# Patient Record
Sex: FEMALE | Race: WHITE | Employment: OTHER | URBAN - NONMETROPOLITAN AREA
[De-identification: names, ages, dates, MRNs, and addresses within clinical notes are randomized per-mention and may not be internally consistent; named-entity substitution may affect disease eponyms.]

---

## 2023-01-01 ENCOUNTER — APPOINTMENT (OUTPATIENT)
Dept: GENERAL RADIOLOGY | Age: 78
DRG: 215 | End: 2023-01-01
Payer: MEDICARE

## 2023-01-01 ENCOUNTER — HOSPITAL ENCOUNTER (INPATIENT)
Age: 78
LOS: 1 days | DRG: 215 | End: 2023-03-12
Attending: EMERGENCY MEDICINE | Admitting: INTERNAL MEDICINE
Payer: MEDICARE

## 2023-01-01 ENCOUNTER — APPOINTMENT (OUTPATIENT)
Dept: CARDIAC CATH/INVASIVE PROCEDURES | Age: 78
DRG: 215 | End: 2023-01-01
Payer: MEDICARE

## 2023-01-01 VITALS
DIASTOLIC BLOOD PRESSURE: 52 MMHG | HEART RATE: 46 BPM | OXYGEN SATURATION: 87 % | TEMPERATURE: 97.3 F | BODY MASS INDEX: 38.65 KG/M2 | WEIGHT: 232 LBS | HEIGHT: 65 IN | RESPIRATION RATE: 16 BRPM | SYSTOLIC BLOOD PRESSURE: 90 MMHG

## 2023-01-01 DIAGNOSIS — R57.0 CARDIOGENIC SHOCK (HCC): ICD-10-CM

## 2023-01-01 DIAGNOSIS — I46.9 CARDIAC ARREST (HCC): ICD-10-CM

## 2023-01-01 DIAGNOSIS — I24.9 ACUTE CORONARY SYNDROME (HCC): Primary | ICD-10-CM

## 2023-01-01 LAB
ABO: NORMAL
ACTIVATED CLOTTING TIME: 251 SECONDS (ref 1–150)
ACTIVATED CLOTTING TIME: 444 SECONDS (ref 1–150)
ACTIVATED CLOTTING TIME: 450 SECONDS (ref 1–150)
ACTIVATED CLOTTING TIME: 474 SECONDS (ref 1–150)
ALBUMIN SERPL BCG-MCNC: 3.2 G/DL (ref 3.5–5.1)
ALBUMIN SERPL BCG-MCNC: 3.3 G/DL (ref 3.5–5.1)
ALBUMIN SERPL BCG-MCNC: 3.9 G/DL (ref 3.5–5.1)
ALP SERPL-CCNC: 150 U/L (ref 38–126)
ALP SERPL-CCNC: 66 U/L (ref 38–126)
ALP SERPL-CCNC: 85 U/L (ref 38–126)
ALT SERPL W/O P-5'-P-CCNC: 100 U/L (ref 11–66)
ALT SERPL W/O P-5'-P-CCNC: 1902 U/L (ref 11–66)
ALT SERPL W/O P-5'-P-CCNC: 21 U/L (ref 11–66)
ANION GAP SERPL CALC-SCNC: 10 MEQ/L (ref 8–16)
ANION GAP SERPL CALC-SCNC: 11 MEQ/L (ref 8–16)
ANION GAP SERPL CALC-SCNC: 19 MEQ/L (ref 8–16)
ANTIBODY SCREEN: NORMAL
ARTERIAL PATENCY WRIST A: ABNORMAL
AST SERPL-CCNC: 2157 U/L (ref 5–40)
AST SERPL-CCNC: 218 U/L (ref 5–40)
AST SERPL-CCNC: 26 U/L (ref 5–40)
BASE EXCESS BLDA CALC-SCNC: -13.4 MMOL/L (ref -2.5–2.5)
BDY SITE: ABNORMAL
BILIRUB CONJ SERPL-MCNC: 1.1 MG/DL (ref 0–0.3)
BILIRUB CONJ SERPL-MCNC: < 0.2 MG/DL (ref 0–0.3)
BILIRUB SERPL-MCNC: 0.5 MG/DL (ref 0.3–1.2)
BILIRUB SERPL-MCNC: 0.8 MG/DL (ref 0.3–1.2)
BILIRUB SERPL-MCNC: 2.1 MG/DL (ref 0.3–1.2)
BUN SERPL-MCNC: 30 MG/DL (ref 7–22)
BUN SERPL-MCNC: 34 MG/DL (ref 7–22)
BUN SERPL-MCNC: 39 MG/DL (ref 7–22)
CA-I BLD ISE-SCNC: 1 MMOL/L (ref 1.12–1.32)
CA-I BLD ISE-SCNC: 1.13 MMOL/L (ref 1.12–1.32)
CALCIUM SERPL-MCNC: 7.8 MG/DL (ref 8.5–10.5)
CALCIUM SERPL-MCNC: 8.8 MG/DL (ref 8.5–10.5)
CALCIUM SERPL-MCNC: 9.2 MG/DL (ref 8.5–10.5)
CHLORIDE SERPL-SCNC: 102 MEQ/L (ref 98–111)
CHLORIDE SERPL-SCNC: 105 MEQ/L (ref 98–111)
CHLORIDE SERPL-SCNC: 105 MEQ/L (ref 98–111)
CHOLEST SERPL-MCNC: 156 MG/DL (ref 100–199)
CO2 SERPL-SCNC: 13 MEQ/L (ref 23–33)
CO2 SERPL-SCNC: 21 MEQ/L (ref 23–33)
CO2 SERPL-SCNC: 24 MEQ/L (ref 23–33)
COLLECTED BY:: ABNORMAL
CREAT SERPL-MCNC: 0.8 MG/DL (ref 0.4–1.2)
CREAT SERPL-MCNC: 1 MG/DL (ref 0.4–1.2)
CREAT SERPL-MCNC: 1.6 MG/DL (ref 0.4–1.2)
DEPRECATED RDW RBC AUTO: 50.5 FL (ref 35–45)
DEPRECATED RDW RBC AUTO: 50.8 FL (ref 35–45)
DEPRECATED RDW RBC AUTO: 52.3 FL (ref 35–45)
EKG ATRIAL RATE: 67 BPM
EKG ATRIAL RATE: 83 BPM
EKG ATRIAL RATE: 95 BPM
EKG P AXIS: 62 DEGREES
EKG P AXIS: 76 DEGREES
EKG P AXIS: 79 DEGREES
EKG P-R INTERVAL: 160 MS
EKG P-R INTERVAL: 166 MS
EKG P-R INTERVAL: 172 MS
EKG Q-T INTERVAL: 386 MS
EKG Q-T INTERVAL: 424 MS
EKG Q-T INTERVAL: 430 MS
EKG QRS DURATION: 138 MS
EKG QRS DURATION: 80 MS
EKG QRS DURATION: 82 MS
EKG QTC CALCULATION (BAZETT): 448 MS
EKG QTC CALCULATION (BAZETT): 453 MS
EKG QTC CALCULATION (BAZETT): 540 MS
EKG R AXIS: -6 DEGREES
EKG R AXIS: 37 DEGREES
EKG R AXIS: 72 DEGREES
EKG T AXIS: 111 DEGREES
EKG T AXIS: 128 DEGREES
EKG T AXIS: 43 DEGREES
EKG VENTRICULAR RATE: 67 BPM
EKG VENTRICULAR RATE: 83 BPM
EKG VENTRICULAR RATE: 95 BPM
ERYTHROCYTE [DISTWIDTH] IN BLOOD BY AUTOMATED COUNT: 13.7 % (ref 11.5–14.5)
ERYTHROCYTE [DISTWIDTH] IN BLOOD BY AUTOMATED COUNT: 13.8 % (ref 11.5–14.5)
ERYTHROCYTE [DISTWIDTH] IN BLOOD BY AUTOMATED COUNT: 13.9 % (ref 11.5–14.5)
FIO2 ON VENT O2 ANALYZER: 6 %
GFR SERPL CREATININE-BSD FRML MDRD: 33 ML/MIN/1.73M2
GFR SERPL CREATININE-BSD FRML MDRD: 58 ML/MIN/1.73M2
GFR SERPL CREATININE-BSD FRML MDRD: > 60 ML/MIN/1.73M2
GLUCOSE BLD-MCNC: 303 MG/DL (ref 70–108)
GLUCOSE SERPL-MCNC: 165 MG/DL (ref 70–108)
GLUCOSE SERPL-MCNC: 168 MG/DL (ref 70–108)
GLUCOSE SERPL-MCNC: 178 MG/DL (ref 70–108)
HCO3 BLDA-SCNC: 11 MMOL/L (ref 23–28)
HCT VFR BLD AUTO: 35.6 % (ref 37–47)
HCT VFR BLD AUTO: 36.7 % (ref 37–47)
HCT VFR BLD AUTO: 41.4 % (ref 37–47)
HCT VFR BLD AUTO: 42.4 % (ref 37–47)
HDLC SERPL-MCNC: 50 MG/DL
HGB BLD-MCNC: 11.2 GM/DL (ref 12–16)
HGB BLD-MCNC: 11.7 GM/DL (ref 12–16)
HGB BLD-MCNC: 12.5 GM/DL (ref 12–16)
HGB BLD-MCNC: 12.9 GM/DL (ref 12–16)
HGB RETIC QN AUTO: 31.1 PG (ref 28.2–35.7)
IMM RETICS NFR: 22.8 % (ref 3–15.9)
INR PPP: 1.56 (ref 0.85–1.13)
LDH SERPL L TO P-CCNC: 662 U/L (ref 100–190)
LDH SERPL L TO P-CCNC: > 2500 U/L (ref 100–190)
LDLC SERPL CALC-MCNC: 93 MG/DL
LIPASE SERPL-CCNC: 17.7 U/L (ref 5.6–51.3)
LV EF: 10 %
LVEF MODALITY: NORMAL
MAGNESIUM SERPL-MCNC: 1.7 MG/DL (ref 1.6–2.4)
MCH RBC QN AUTO: 30.5 PG (ref 26–33)
MCH RBC QN AUTO: 31.3 PG (ref 26–33)
MCH RBC QN AUTO: 31.9 PG (ref 26–33)
MCHC RBC AUTO-ENTMCNC: 30.2 GM/DL (ref 32.2–35.5)
MCHC RBC AUTO-ENTMCNC: 30.4 GM/DL (ref 32.2–35.5)
MCHC RBC AUTO-ENTMCNC: 31.9 GM/DL (ref 32.2–35.5)
MCV RBC AUTO: 100 FL (ref 81–99)
MCV RBC AUTO: 101 FL (ref 81–99)
MCV RBC AUTO: 102.9 FL (ref 81–99)
MRSA DNA SPEC QL NAA+PROBE: NEGATIVE
OSMOLALITY SERPL CALC.SUM OF ELEC: 289.4 MOSMOL/KG (ref 275–300)
PCO2 BLDA: 23 MMHG (ref 35–45)
PH BLDA: 7.3 [PH] (ref 7.35–7.45)
PHOSPHATE SERPL-MCNC: 5.2 MG/DL (ref 2.4–4.7)
PLATELET # BLD AUTO: 159 THOU/MM3 (ref 130–400)
PLATELET # BLD AUTO: 164 THOU/MM3 (ref 130–400)
PLATELET # BLD AUTO: 184 THOU/MM3 (ref 130–400)
PMV BLD AUTO: 11.9 FL (ref 9.4–12.4)
PO2 BLDA: 163 MMHG (ref 71–104)
POTASSIUM SERPL-SCNC: 4.9 MEQ/L (ref 3.5–5.2)
POTASSIUM SERPL-SCNC: 5.2 MEQ/L (ref 3.5–5.2)
POTASSIUM SERPL-SCNC: 5.3 MEQ/L (ref 3.5–5.2)
PROT SERPL-MCNC: 5.7 G/DL (ref 6.1–8)
PROT SERPL-MCNC: 6.1 G/DL (ref 6.1–8)
PROT SERPL-MCNC: 6.3 G/DL (ref 6.1–8)
RBC # BLD AUTO: 3.67 MILL/MM3 (ref 4.2–5.4)
RBC # BLD AUTO: 4.1 MILL/MM3 (ref 4.2–5.4)
RBC # BLD AUTO: 4.12 MILL/MM3 (ref 4.2–5.4)
REASON FOR REJECTION: NORMAL
REJECTED TEST: NORMAL
RETICS # AUTO: 83 THOU/MM3 (ref 20–115)
RETICS/RBC NFR AUTO: 2 % (ref 0.5–2)
RH FACTOR: NORMAL
SAO2 % BLD: 71 % (ref 94–97)
SAO2 % BLD: 72 % (ref 94–97)
SAO2 % BLD: 77 % (ref 94–97)
SAO2 % BLD: 99 % (ref 94–97)
SAO2 % BLDA: 99 %
SODIUM SERPL-SCNC: 133 MEQ/L (ref 135–145)
SODIUM SERPL-SCNC: 137 MEQ/L (ref 135–145)
SODIUM SERPL-SCNC: 140 MEQ/L (ref 135–145)
TRIGL SERPL-MCNC: 66 MG/DL (ref 0–199)
TROPONIN T: 0.04 NG/ML
TROPONIN T: 0.04 NG/ML
VANA ISLT/SPM QL: NEGATIVE
WBC # BLD AUTO: 16.4 THOU/MM3 (ref 4.8–10.8)
WBC # BLD AUTO: 22.7 THOU/MM3 (ref 4.8–10.8)
WBC # BLD AUTO: 9.1 THOU/MM3 (ref 4.8–10.8)

## 2023-01-01 PROCEDURE — 2000000000 HC ICU R&B

## 2023-01-01 PROCEDURE — C1874 STENT, COATED/COV W/DEL SYS: HCPCS

## 2023-01-01 PROCEDURE — 02HV33Z INSERTION OF INFUSION DEVICE INTO SUPERIOR VENA CAVA, PERCUTANEOUS APPROACH: ICD-10-PCS | Performed by: STUDENT IN AN ORGANIZED HEALTH CARE EDUCATION/TRAINING PROGRAM

## 2023-01-01 PROCEDURE — 80053 COMPREHEN METABOLIC PANEL: CPT

## 2023-01-01 PROCEDURE — C9600 PERC DRUG-EL COR STENT SING: HCPCS

## 2023-01-01 PROCEDURE — 93010 ELECTROCARDIOGRAM REPORT: CPT | Performed by: INTERNAL MEDICINE

## 2023-01-01 PROCEDURE — 2580000003 HC RX 258: Performed by: INTERNAL MEDICINE

## 2023-01-01 PROCEDURE — 33990 INSJ PERQ VAD L HRT ARTERIAL: CPT | Performed by: INTERNAL MEDICINE

## 2023-01-01 PROCEDURE — 85610 PROTHROMBIN TIME: CPT

## 2023-01-01 PROCEDURE — 82248 BILIRUBIN DIRECT: CPT

## 2023-01-01 PROCEDURE — 2709999900 HC NON-CHARGEABLE SUPPLY

## 2023-01-01 PROCEDURE — 82947 ASSAY GLUCOSE BLOOD QUANT: CPT

## 2023-01-01 PROCEDURE — 5A12012 PERFORMANCE OF CARDIAC OUTPUT, SINGLE, MANUAL: ICD-10-PCS

## 2023-01-01 PROCEDURE — 85018 HEMOGLOBIN: CPT

## 2023-01-01 PROCEDURE — 6370000000 HC RX 637 (ALT 250 FOR IP): Performed by: INTERNAL MEDICINE

## 2023-01-01 PROCEDURE — 87070 CULTURE OTHR SPECIMN AEROBIC: CPT

## 2023-01-01 PROCEDURE — C1725 CATH, TRANSLUMIN NON-LASER: HCPCS

## 2023-01-01 PROCEDURE — 2500000003 HC RX 250 WO HCPCS: Performed by: INTERNAL MEDICINE

## 2023-01-01 PROCEDURE — 6360000004 HC RX CONTRAST MEDICATION: Performed by: INTERNAL MEDICINE

## 2023-01-01 PROCEDURE — 83615 LACTATE (LD) (LDH) ENZYME: CPT

## 2023-01-01 PROCEDURE — 33993 REPOSG PERQ R/L HRT VAD: CPT | Performed by: INTERNAL MEDICINE

## 2023-01-01 PROCEDURE — 93458 L HRT ARTERY/VENTRICLE ANGIO: CPT | Performed by: INTERNAL MEDICINE

## 2023-01-01 PROCEDURE — 6360000002 HC RX W HCPCS

## 2023-01-01 PROCEDURE — C1887 CATHETER, GUIDING: HCPCS

## 2023-01-01 PROCEDURE — 92928 PRQ TCAT PLMT NTRAC ST 1 LES: CPT | Performed by: INTERNAL MEDICINE

## 2023-01-01 PROCEDURE — 93308 TTE F-UP OR LMTD: CPT

## 2023-01-01 PROCEDURE — 2500000003 HC RX 250 WO HCPCS

## 2023-01-01 PROCEDURE — 85046 RETICYTE/HGB CONCENTRATE: CPT

## 2023-01-01 PROCEDURE — 94002 VENT MGMT INPAT INIT DAY: CPT

## 2023-01-01 PROCEDURE — C1729 CATH, DRAINAGE: HCPCS

## 2023-01-01 PROCEDURE — 37799 UNLISTED PX VASCULAR SURGERY: CPT

## 2023-01-01 PROCEDURE — 33017 PRCRD DRG 6YR+ W/O CGEN CAR: CPT

## 2023-01-01 PROCEDURE — 99223 1ST HOSP IP/OBS HIGH 75: CPT | Performed by: INTERNAL MEDICINE

## 2023-01-01 PROCEDURE — 84100 ASSAY OF PHOSPHORUS: CPT

## 2023-01-01 PROCEDURE — 99221 1ST HOSP IP/OBS SF/LOW 40: CPT | Performed by: THORACIC SURGERY (CARDIOTHORACIC VASCULAR SURGERY)

## 2023-01-01 PROCEDURE — 94761 N-INVAS EAR/PLS OXIMETRY MLT: CPT

## 2023-01-01 PROCEDURE — 86850 RBC ANTIBODY SCREEN: CPT

## 2023-01-01 PROCEDURE — 36415 COLL VENOUS BLD VENIPUNCTURE: CPT

## 2023-01-01 PROCEDURE — 5A1935Z RESPIRATORY VENTILATION, LESS THAN 24 CONSECUTIVE HOURS: ICD-10-PCS

## 2023-01-01 PROCEDURE — 85347 COAGULATION TIME ACTIVATED: CPT

## 2023-01-01 PROCEDURE — 31500 INSERT EMERGENCY AIRWAY: CPT

## 2023-01-01 PROCEDURE — 85014 HEMATOCRIT: CPT

## 2023-01-01 PROCEDURE — 82810 BLOOD GASES O2 SAT ONLY: CPT

## 2023-01-01 PROCEDURE — 71045 X-RAY EXAM CHEST 1 VIEW: CPT

## 2023-01-01 PROCEDURE — 2500000003 HC RX 250 WO HCPCS: Performed by: STUDENT IN AN ORGANIZED HEALTH CARE EDUCATION/TRAINING PROGRAM

## 2023-01-01 PROCEDURE — B2111ZZ FLUOROSCOPY OF MULTIPLE CORONARY ARTERIES USING LOW OSMOLAR CONTRAST: ICD-10-PCS | Performed by: INTERNAL MEDICINE

## 2023-01-01 PROCEDURE — 83690 ASSAY OF LIPASE: CPT

## 2023-01-01 PROCEDURE — 93005 ELECTROCARDIOGRAM TRACING: CPT

## 2023-01-01 PROCEDURE — 02WA3RZ REVISION OF SHORT-TERM EXTERNAL HEART ASSIST SYSTEM IN HEART, PERCUTANEOUS APPROACH: ICD-10-PCS | Performed by: INTERNAL MEDICINE

## 2023-01-01 PROCEDURE — 93306 TTE W/DOPPLER COMPLETE: CPT

## 2023-01-01 PROCEDURE — 87086 URINE CULTURE/COLONY COUNT: CPT

## 2023-01-01 PROCEDURE — 84484 ASSAY OF TROPONIN QUANT: CPT

## 2023-01-01 PROCEDURE — 5A0221D ASSISTANCE WITH CARDIAC OUTPUT USING IMPELLER PUMP, CONTINUOUS: ICD-10-PCS | Performed by: INTERNAL MEDICINE

## 2023-01-01 PROCEDURE — C1769 GUIDE WIRE: HCPCS

## 2023-01-01 PROCEDURE — 87641 MR-STAPH DNA AMP PROBE: CPT

## 2023-01-01 PROCEDURE — 80061 LIPID PANEL: CPT

## 2023-01-01 PROCEDURE — 86900 BLOOD TYPING SEROLOGIC ABO: CPT

## 2023-01-01 PROCEDURE — 82803 BLOOD GASES ANY COMBINATION: CPT

## 2023-01-01 PROCEDURE — 85027 COMPLETE CBC AUTOMATED: CPT

## 2023-01-01 PROCEDURE — 0BH17EZ INSERTION OF ENDOTRACHEAL AIRWAY INTO TRACHEA, VIA NATURAL OR ARTIFICIAL OPENING: ICD-10-PCS

## 2023-01-01 PROCEDURE — C1894 INTRO/SHEATH, NON-LASER: HCPCS

## 2023-01-01 PROCEDURE — 2700000000 HC OXYGEN THERAPY PER DAY

## 2023-01-01 PROCEDURE — 33017 PRCRD DRG 6YR+ W/O CGEN CAR: CPT | Performed by: INTERNAL MEDICINE

## 2023-01-01 PROCEDURE — 83010 ASSAY OF HAPTOGLOBIN QUANT: CPT

## 2023-01-01 PROCEDURE — 6360000002 HC RX W HCPCS: Performed by: INTERNAL MEDICINE

## 2023-01-01 PROCEDURE — 92941 PRQ TRLML REVSC TOT OCCL AMI: CPT | Performed by: INTERNAL MEDICINE

## 2023-01-01 PROCEDURE — 93005 ELECTROCARDIOGRAM TRACING: CPT | Performed by: STUDENT IN AN ORGANIZED HEALTH CARE EDUCATION/TRAINING PROGRAM

## 2023-01-01 PROCEDURE — 0W9D3ZZ DRAINAGE OF PERICARDIAL CAVITY, PERCUTANEOUS APPROACH: ICD-10-PCS | Performed by: INTERNAL MEDICINE

## 2023-01-01 PROCEDURE — 86901 BLOOD TYPING SEROLOGIC RH(D): CPT

## 2023-01-01 PROCEDURE — C1760 CLOSURE DEV, VASC: HCPCS

## 2023-01-01 PROCEDURE — 83735 ASSAY OF MAGNESIUM: CPT

## 2023-01-01 PROCEDURE — B2131ZZ FLUOROSCOPY OF MULTIPLE CORONARY ARTERY BYPASS GRAFTS USING LOW OSMOLAR CONTRAST: ICD-10-PCS | Performed by: INTERNAL MEDICINE

## 2023-01-01 PROCEDURE — B2151ZZ FLUOROSCOPY OF LEFT HEART USING LOW OSMOLAR CONTRAST: ICD-10-PCS | Performed by: INTERNAL MEDICINE

## 2023-01-01 PROCEDURE — P9016 RBC LEUKOCYTES REDUCED: HCPCS

## 2023-01-01 PROCEDURE — 027236Z DILATION OF CORONARY ARTERY, THREE ARTERIES WITH THREE DRUG-ELUTING INTRALUMINAL DEVICES, PERCUTANEOUS APPROACH: ICD-10-PCS | Performed by: INTERNAL MEDICINE

## 2023-01-01 PROCEDURE — 33990 INSJ PERQ VAD L HRT ARTERIAL: CPT

## 2023-01-01 PROCEDURE — 93456 R HRT CORONARY ARTERY ANGIO: CPT

## 2023-01-01 PROCEDURE — 93005 ELECTROCARDIOGRAM TRACING: CPT | Performed by: INTERNAL MEDICINE

## 2023-01-01 PROCEDURE — 96365 THER/PROPH/DIAG IV INF INIT: CPT

## 2023-01-01 PROCEDURE — 99285 EMERGENCY DEPT VISIT HI MDM: CPT

## 2023-01-01 PROCEDURE — 86923 COMPATIBILITY TEST ELECTRIC: CPT

## 2023-01-01 PROCEDURE — 02HA3RZ INSERTION OF SHORT-TERM EXTERNAL HEART ASSIST SYSTEM INTO HEART, PERCUTANEOUS APPROACH: ICD-10-PCS | Performed by: INTERNAL MEDICINE

## 2023-01-01 PROCEDURE — B4101ZZ FLUOROSCOPY OF ABDOMINAL AORTA USING LOW OSMOLAR CONTRAST: ICD-10-PCS | Performed by: INTERNAL MEDICINE

## 2023-01-01 PROCEDURE — 36430 TRANSFUSION BLD/BLD COMPNT: CPT

## 2023-01-01 PROCEDURE — 93307 TTE W/O DOPPLER COMPLETE: CPT

## 2023-01-01 PROCEDURE — 93458 L HRT ARTERY/VENTRICLE ANGIO: CPT

## 2023-01-01 PROCEDURE — C9606 PERC D-E COR REVASC W AMI S: HCPCS

## 2023-01-01 PROCEDURE — 92921 HC PRQ CARDIAC ANGIO ADDL ART: CPT

## 2023-01-01 PROCEDURE — 31500 INSERT EMERGENCY AIRWAY: CPT | Performed by: SURGERY

## 2023-01-01 PROCEDURE — 2580000003 HC RX 258: Performed by: STUDENT IN AN ORGANIZED HEALTH CARE EDUCATION/TRAINING PROGRAM

## 2023-01-01 PROCEDURE — 99291 CRITICAL CARE FIRST HOUR: CPT | Performed by: SURGERY

## 2023-01-01 PROCEDURE — 82330 ASSAY OF CALCIUM: CPT

## 2023-01-01 PROCEDURE — 4A023N8 MEASUREMENT OF CARDIAC SAMPLING AND PRESSURE, BILATERAL, PERCUTANEOUS APPROACH: ICD-10-PCS | Performed by: INTERNAL MEDICINE

## 2023-01-01 PROCEDURE — 87500 VANOMYCIN DNA AMP PROBE: CPT

## 2023-01-01 PROCEDURE — 93503 INSERT/PLACE HEART CATHETER: CPT

## 2023-01-01 PROCEDURE — C1751 CATH, INF, PER/CENT/MIDLINE: HCPCS

## 2023-01-01 PROCEDURE — 99239 HOSP IP/OBS DSCHRG MGMT >30: CPT | Performed by: SURGERY

## 2023-01-01 PROCEDURE — 93456 R HRT CORONARY ARTERY ANGIO: CPT | Performed by: INTERNAL MEDICINE

## 2023-01-01 RX ORDER — 0.9 % SODIUM CHLORIDE 0.9 %
1000 INTRAVENOUS SOLUTION INTRAVENOUS ONCE
Status: COMPLETED | OUTPATIENT
Start: 2023-01-01 | End: 2023-01-01

## 2023-01-01 RX ORDER — FENTANYL CITRATE 50 UG/ML
INJECTION, SOLUTION INTRAMUSCULAR; INTRAVENOUS
Status: DISCONTINUED
Start: 2023-01-01 | End: 2023-01-01 | Stop reason: HOSPADM

## 2023-01-01 RX ORDER — MIDAZOLAM HYDROCHLORIDE 1 MG/ML
INJECTION INTRAMUSCULAR; INTRAVENOUS
Status: COMPLETED | OUTPATIENT
Start: 2023-01-01 | End: 2023-01-01

## 2023-01-01 RX ORDER — ROSUVASTATIN CALCIUM 20 MG/1
40 TABLET, COATED ORAL NIGHTLY
Status: DISCONTINUED | OUTPATIENT
Start: 2023-01-01 | End: 2023-01-01 | Stop reason: HOSPADM

## 2023-01-01 RX ORDER — ONDANSETRON 2 MG/ML
4 INJECTION INTRAMUSCULAR; INTRAVENOUS EVERY 4 HOURS
Status: DISCONTINUED | OUTPATIENT
Start: 2023-01-01 | End: 2023-01-01 | Stop reason: HOSPADM

## 2023-01-01 RX ORDER — METOCLOPRAMIDE HYDROCHLORIDE 5 MG/ML
10 INJECTION INTRAMUSCULAR; INTRAVENOUS ONCE
Status: DISCONTINUED | OUTPATIENT
Start: 2023-01-01 | End: 2023-01-01 | Stop reason: HOSPADM

## 2023-01-01 RX ORDER — SODIUM CHLORIDE 9 MG/ML
INJECTION, SOLUTION INTRAVENOUS PRN
Status: DISCONTINUED | OUTPATIENT
Start: 2023-01-01 | End: 2023-01-01 | Stop reason: HOSPADM

## 2023-01-01 RX ORDER — FENTANYL CITRATE 50 UG/ML
INJECTION, SOLUTION INTRAMUSCULAR; INTRAVENOUS
Status: COMPLETED | OUTPATIENT
Start: 2023-01-01 | End: 2023-01-01

## 2023-01-01 RX ORDER — LORAZEPAM 2 MG/ML
2 INJECTION INTRAMUSCULAR
Status: DISCONTINUED | OUTPATIENT
Start: 2023-01-01 | End: 2023-01-01 | Stop reason: HOSPADM

## 2023-01-01 RX ORDER — SODIUM CHLORIDE 0.9 % (FLUSH) 0.9 %
5-40 SYRINGE (ML) INJECTION EVERY 12 HOURS SCHEDULED
Status: DISCONTINUED | OUTPATIENT
Start: 2023-01-01 | End: 2023-01-01 | Stop reason: HOSPADM

## 2023-01-01 RX ORDER — 0.9 % SODIUM CHLORIDE 0.9 %
200 INTRAVENOUS SOLUTION INTRAVENOUS ONCE
Status: COMPLETED | OUTPATIENT
Start: 2023-01-01 | End: 2023-01-01

## 2023-01-01 RX ORDER — MORPHINE SULFATE 2 MG/ML
INJECTION, SOLUTION INTRAMUSCULAR; INTRAVENOUS
Status: COMPLETED
Start: 2023-01-01 | End: 2023-01-01

## 2023-01-01 RX ORDER — GLYCOPYRROLATE 0.2 MG/ML
0.1 INJECTION INTRAMUSCULAR; INTRAVENOUS ONCE
Status: COMPLETED | OUTPATIENT
Start: 2023-01-01 | End: 2023-01-01

## 2023-01-01 RX ORDER — LORAZEPAM 2 MG/ML
1 INJECTION INTRAMUSCULAR
Status: DISCONTINUED | OUTPATIENT
Start: 2023-01-01 | End: 2023-01-01 | Stop reason: HOSPADM

## 2023-01-01 RX ORDER — NOREPINEPHRINE BIT/0.9 % NACL 16MG/250ML
1-100 INFUSION BOTTLE (ML) INTRAVENOUS CONTINUOUS
Status: DISCONTINUED | OUTPATIENT
Start: 2023-01-01 | End: 2023-01-01 | Stop reason: HOSPADM

## 2023-01-01 RX ORDER — HYDROCODONE BITARTRATE AND ACETAMINOPHEN 5; 325 MG/1; MG/1
1 TABLET ORAL EVERY 4 HOURS PRN
Status: DISCONTINUED | OUTPATIENT
Start: 2023-01-01 | End: 2023-01-01 | Stop reason: HOSPADM

## 2023-01-01 RX ORDER — MAGNESIUM SULFATE 1 G/100ML
1000 INJECTION INTRAVENOUS ONCE
Status: COMPLETED | OUTPATIENT
Start: 2023-01-01 | End: 2023-01-01

## 2023-01-01 RX ORDER — SODIUM CHLORIDE 9 MG/ML
INJECTION, SOLUTION INTRAVENOUS CONTINUOUS
Status: DISCONTINUED | OUTPATIENT
Start: 2023-01-01 | End: 2023-01-01 | Stop reason: HOSPADM

## 2023-01-01 RX ORDER — LORAZEPAM 2 MG/ML
INJECTION INTRAMUSCULAR
Status: DISCONTINUED
Start: 2023-01-01 | End: 2023-01-01 | Stop reason: HOSPADM

## 2023-01-01 RX ORDER — DEXTROSE MONOHYDRATE 50 MG/ML
INJECTION, SOLUTION INTRAVENOUS CONTINUOUS
Status: DISCONTINUED | OUTPATIENT
Start: 2023-01-01 | End: 2023-01-01 | Stop reason: HOSPADM

## 2023-01-01 RX ORDER — MORPHINE SULFATE 4 MG/ML
4 INJECTION, SOLUTION INTRAMUSCULAR; INTRAVENOUS
Status: DISCONTINUED | OUTPATIENT
Start: 2023-01-01 | End: 2023-01-01 | Stop reason: HOSPADM

## 2023-01-01 RX ORDER — HEPARIN SODIUM 1000 [USP'U]/ML
5000 INJECTION, SOLUTION INTRAVENOUS; SUBCUTANEOUS ONCE
Status: DISCONTINUED | OUTPATIENT
Start: 2023-01-01 | End: 2023-01-01 | Stop reason: HOSPADM

## 2023-01-01 RX ORDER — MIDAZOLAM HYDROCHLORIDE 1 MG/ML
1 INJECTION INTRAMUSCULAR; INTRAVENOUS ONCE
Status: COMPLETED | OUTPATIENT
Start: 2023-01-01 | End: 2023-01-01

## 2023-01-01 RX ORDER — MORPHINE SULFATE 2 MG/ML
2 INJECTION, SOLUTION INTRAMUSCULAR; INTRAVENOUS
Status: DISCONTINUED | OUTPATIENT
Start: 2023-01-01 | End: 2023-01-01 | Stop reason: HOSPADM

## 2023-01-01 RX ORDER — MORPHINE SULFATE 2 MG/ML
1 INJECTION, SOLUTION INTRAMUSCULAR; INTRAVENOUS ONCE
Status: COMPLETED | OUTPATIENT
Start: 2023-01-01 | End: 2023-01-01

## 2023-01-01 RX ORDER — MORPHINE SULFATE 4 MG/ML
3 INJECTION, SOLUTION INTRAMUSCULAR; INTRAVENOUS
Status: DISCONTINUED | OUTPATIENT
Start: 2023-01-01 | End: 2023-01-01 | Stop reason: HOSPADM

## 2023-01-01 RX ORDER — NOREPINEPHRINE BIT/0.9 % NACL 16MG/250ML
1-100 INFUSION BOTTLE (ML) INTRAVENOUS CONTINUOUS
Status: DISCONTINUED | OUTPATIENT
Start: 2023-01-01 | End: 2023-01-01

## 2023-01-01 RX ORDER — LORAZEPAM 2 MG/ML
3 INJECTION INTRAMUSCULAR
Status: DISCONTINUED | OUTPATIENT
Start: 2023-01-01 | End: 2023-01-01 | Stop reason: HOSPADM

## 2023-01-01 RX ORDER — MIDAZOLAM HYDROCHLORIDE 1 MG/ML
1-10 INJECTION, SOLUTION INTRAVENOUS CONTINUOUS
Status: DISCONTINUED | OUTPATIENT
Start: 2023-01-01 | End: 2023-01-01 | Stop reason: HOSPADM

## 2023-01-01 RX ORDER — ASPIRIN 81 MG/1
81 TABLET ORAL DAILY
Status: DISCONTINUED | OUTPATIENT
Start: 2023-01-01 | End: 2023-01-01 | Stop reason: HOSPADM

## 2023-01-01 RX ORDER — HYDROCODONE BITARTRATE AND ACETAMINOPHEN 5; 325 MG/1; MG/1
2 TABLET ORAL EVERY 4 HOURS PRN
Status: DISCONTINUED | OUTPATIENT
Start: 2023-01-01 | End: 2023-01-01 | Stop reason: HOSPADM

## 2023-01-01 RX ORDER — MIDAZOLAM HYDROCHLORIDE 1 MG/ML
INJECTION INTRAMUSCULAR; INTRAVENOUS
Status: DISCONTINUED
Start: 2023-01-01 | End: 2023-01-01 | Stop reason: HOSPADM

## 2023-01-01 RX ORDER — MAGNESIUM SULFATE HEPTAHYDRATE 500 MG/ML
1000 INJECTION, SOLUTION INTRAMUSCULAR; INTRAVENOUS ONCE
Status: DISCONTINUED | OUTPATIENT
Start: 2023-01-01 | End: 2023-01-01 | Stop reason: HOSPADM

## 2023-01-01 RX ORDER — SODIUM CHLORIDE 0.9 % (FLUSH) 0.9 %
5-40 SYRINGE (ML) INJECTION PRN
Status: DISCONTINUED | OUTPATIENT
Start: 2023-01-01 | End: 2023-01-01 | Stop reason: HOSPADM

## 2023-01-01 RX ADMIN — MIDAZOLAM 5 MG: 1 INJECTION INTRAMUSCULAR; INTRAVENOUS at 22:31

## 2023-01-01 RX ADMIN — IOPAMIDOL 270 ML: 755 INJECTION, SOLUTION INTRAVENOUS at 15:54

## 2023-01-01 RX ADMIN — MORPHINE SULFATE 2 MG: 2 INJECTION, SOLUTION INTRAMUSCULAR; INTRAVENOUS at 06:11

## 2023-01-01 RX ADMIN — Medication 2 MG/HR: at 22:56

## 2023-01-01 RX ADMIN — IOPAMIDOL 30 ML: 755 INJECTION, SOLUTION INTRAVENOUS at 00:53

## 2023-01-01 RX ADMIN — SODIUM CHLORIDE: 9 INJECTION, SOLUTION INTRAVENOUS at 19:44

## 2023-01-01 RX ADMIN — TICAGRELOR 90 MG: 90 TABLET ORAL at 03:44

## 2023-01-01 RX ADMIN — SODIUM CHLORIDE 1000 ML: 9 INJECTION, SOLUTION INTRAVENOUS at 12:37

## 2023-01-01 RX ADMIN — Medication 37 MCG/MIN: at 04:17

## 2023-01-01 RX ADMIN — FENTANYL CITRATE 100 MCG: 50 INJECTION, SOLUTION INTRAMUSCULAR; INTRAVENOUS at 22:30

## 2023-01-01 RX ADMIN — ROSUVASTATIN CALCIUM 40 MG: 20 TABLET, FILM COATED ORAL at 03:44

## 2023-01-01 RX ADMIN — MILRINONE LACTATE 0.25 MCG/KG/MIN: 1 INJECTION, SOLUTION INTRAVENOUS at 03:27

## 2023-01-01 RX ADMIN — MIDAZOLAM 5 MG: 1 INJECTION INTRAMUSCULAR; INTRAVENOUS at 22:30

## 2023-01-01 RX ADMIN — Medication 5 MCG/MIN: at 12:46

## 2023-01-01 RX ADMIN — SODIUM CHLORIDE 200 ML: 9 INJECTION, SOLUTION INTRAVENOUS at 21:15

## 2023-01-01 RX ADMIN — Medication 5 MCG/MIN: at 19:21

## 2023-01-01 RX ADMIN — ONDANSETRON 4 MG: 2 INJECTION INTRAMUSCULAR; INTRAVENOUS at 20:22

## 2023-01-01 RX ADMIN — DEXTROSE MONOHYDRATE: 50 INJECTION, SOLUTION INTRAVENOUS at 03:46

## 2023-01-01 RX ADMIN — TICAGRELOR 180 MG: 90 TABLET ORAL at 17:19

## 2023-01-01 RX ADMIN — GLYCOPYRROLATE 0.1 MG: 0.2 INJECTION INTRAMUSCULAR; INTRAVENOUS at 06:25

## 2023-01-01 RX ADMIN — HEPARIN SODIUM: 10000 INJECTION INTRAVENOUS; SUBCUTANEOUS at 18:27

## 2023-01-01 RX ADMIN — MORPHINE SULFATE 1 MG: 2 INJECTION, SOLUTION INTRAMUSCULAR; INTRAVENOUS at 20:23

## 2023-01-01 RX ADMIN — MIDAZOLAM 1 MG: 1 INJECTION INTRAMUSCULAR; INTRAVENOUS at 20:22

## 2023-01-01 RX ADMIN — MAGNESIUM SULFATE HEPTAHYDRATE 1000 MG: 1 INJECTION, SOLUTION INTRAVENOUS at 21:43

## 2023-01-01 ASSESSMENT — PULMONARY FUNCTION TESTS: PIF_VALUE: 22

## 2023-01-01 ASSESSMENT — PAIN SCALES - GENERAL: PAINLEVEL_OUTOF10: 6

## 2023-01-01 ASSESSMENT — PAIN - FUNCTIONAL ASSESSMENT: PAIN_FUNCTIONAL_ASSESSMENT: 0-10

## 2023-01-01 ASSESSMENT — HEART SCORE: ECG: 2

## 2023-03-11 PROBLEM — I21.3 STEMI (ST ELEVATION MYOCARDIAL INFARCTION) (HCC): Status: ACTIVE | Noted: 2023-01-01

## 2023-03-11 PROBLEM — R57.0 CARDIOGENIC SHOCK (HCC): Status: ACTIVE | Noted: 2023-01-01

## 2023-03-11 PROBLEM — I24.9 ACUTE CORONARY SYNDROME (HCC): Status: ACTIVE | Noted: 2023-01-01

## 2023-03-11 PROBLEM — I21.01 STEMI INVOLVING LEFT MAIN CORONARY ARTERY (HCC): Status: ACTIVE | Noted: 2023-01-01

## 2023-03-11 NOTE — PROGRESS NOTES
Pt is a 77y. o. female in [address] [place] [address].  was called to room due to a STEMI alert. Soraya's daughter was in the room and her granddaughter and other daughter arrived shortly thereafter.  was able to pray with patient and daughter as she was being tended to, present for pre-cath lab procedure and escorted family to waiting area and prayed once more-met with gratitude by family. 03/11/23 1616   Encounter Summary   Encounter Overview/Reason  Crisis   Service Provided For: Patient and family together   Referral/Consult From: [id number] [address] Family members   Last Encounter  03/11/23   Complexity of Encounter Moderate   Begin Time 1245   End Time  1318   Total Time Calculated 33 min   Crisis   Type Code STEMI   Assessment/Intervention/Outcome   Assessment Anxious; Concerns with suffering; Impaired resilience; Shock   Intervention Active listening;Sustaining Presence/Ministry of presence;Prayer (assurance of)/[place]; Facilitated/Participated in hospitals; Explored/Affirmed feelings, thoughts, concerns   Outcome Receptive; Expressed Gratitude;Coping

## 2023-03-11 NOTE — ED NOTES
EKG obtained at 1201, EKG was sent to Cardiologists by Dr. Jose Yi at 0511. STEMI paged at [phone].      Cesar Sands RN  03/11/23 5184

## 2023-03-11 NOTE — BRIEF OP NOTE
[address] [address]  Sedation/Analgesia Post Sedation Record    Pt Name: Flory Zhou  Account number: [de-identified]  MRN: [id number]  YOB: 1945  Procedure Performed By: Zenon Osgood, MD MD   Primary Care Physician: None None  Date: 3/11/2023    POST-PROCEDURE    Physicians/Assistants: Zenon Osgood, MD MD     Procedure Performed:Cath/ PCI, Impella placement      Sedation/Anesthesia: Versed/ Fentanyl and 2% xylocaine local anesthesia. Estimated Blood Loss: < 50 ml. Specimens Removed: None         Disposition of Specimen: N/A        Complications: No Immediate Complications. Post-procedure Diagnosis/Findings:       As detailed in H&P note      Above findings and plan of care were discussed with patient and her family, questions were answered, agreeable with plan.        Zenon Osgood, MD, Dara Deng   Electronically signed 3/11/2023 at 4:25 PM  Interventional Cardiology

## 2023-03-11 NOTE — ED NOTES
Dr. Larisa Frausto at bedside with ultrasound machine to assess pt.       Gary Elliott RN  03/11/23 6831

## 2023-03-11 NOTE — ED PROVIDER NOTES
I performed a history and physical examination of the patient and discussed management with the resident. I reviewed the residents note and agree with the documented findings and plan of care. Any areas of disagreement are noted on the chart. I was personally present for the key portions of any procedures. I have documented in the chart those procedures where I was not present during the key portions. I have reviewed the emergency nurses triage note. I agree with the chief complaint, past medical history, past surgical history, allergies, medications, social and family history as documented unless otherwise noted below. Documentation of the HPI, Physical Exam and Medical Decision Making performed by medical students or scribes is based on my personal performance of the HPI, PE and MDM. For Phys Assistant/ Nurse Practitioner cases/documentation I have personally evaluated this patient and have completed at least one if not all key elements of the E/M (history, physical exam, and MDM). My findings are as noted below. In other words, I personally saw and examined the patient I have reviewed and agreed with the resident findings including all diagnostic interpretations and treatment plans as written. I was present for the key portion of any procedures performed and the inclusive time noted in any critical care statement. Patient comes in today for active chest pain. Patient states that she has been having anginal-like symptoms on and off for the last several days. Patient states she got up today she started having chest pains states this progressively worsened. Patient was brought in by the squad. They were given morphine and aspirin. Here today EKG shows some ST segment elevations at V3 and V4 possibly V5 no reciprocal changes. Patient had persistent chest pain. I did call and speak with Dr. Deleon Star discussed the case with him. We will activate a STEMI alert.   Patient's past medical history is not available to us. She tells us that she does not really have any heart problems that she knows of. She has hypertension hyperlipidemia and diabetes. Patient is otherwise resting comfortably on the cot no apparent distress, slightly hypotensive. We will start the patient on Levophed. Patient also have labs ordered here. Central line was placed by the resident. See note by her. EKG reveals normal sinus rhythm, normal axis, ventricular rate of 67 OR interval 166 QRS duration 80 QT interval 424 QTc of 448    XR CHEST PORTABLE   Final Result   1. Mild cardiomegaly with pulmonary vascular congestion suspicious for congestive heart failure. 2. Prominent pulmonary interstitium suspicious for pulmonary edema. **This report has been created using voice recognition software. It may contain minor errors which are inherent in voice recognition technology. **      Final report electronically signed by Dr. Dora Zhang on 3/11/2023 6:45 PM      XR CHEST PORTABLE   Final Result   1. Mild cardiomegaly with pulmonary vascular congestion suspicious for congestive heart failure. 2. Prominent pulmonary interstitium suspicious for pulmonary edema. **This report has been created using voice recognition software. It may contain minor errors which are inherent in voice recognition technology. **      Final report electronically signed by Dr. Dora Zhang on 3/11/2023 1:23 PM        Labs Reviewed   CBC - Abnormal; Notable for the following components:       Result Value    RBC 4.10 (*)     .0 (*)     MCHC 30.2 (*)     RDW-SD 50.8 (*)     All other components within normal limits   COMPREHENSIVE METABOLIC PANEL - Abnormal; Notable for the following components:    Glucose 168 (*)     BUN 30 (*)     All other components within normal limits   TROPONIN - Abnormal; Notable for the following components:    Troponin T 0.042 (*)     All other components within normal limits   TROPONIN - Abnormal; Notable for the following components:    Troponin T 0.041 (*)     All other components within normal limits   CBC - Abnormal; Notable for the following components:    WBC 16.4 (*)     RBC 3.67 (*)     Hemoglobin 11.7 (*)     Hematocrit 36.7 (*)     .0 (*)     MCHC 31.9 (*)     RDW-SD 50.5 (*)     All other components within normal limits   BASIC METABOLIC PANEL - Abnormal; Notable for the following components:    Sodium 133 (*)     Potassium 5.3 (*)     CO2 21 (*)     Glucose 178 (*)     BUN 34 (*)     All other components within normal limits   PHOSPHORUS - Abnormal; Notable for the following components:    Phosphorus 5.2 (*)     All other components within normal limits   PROTIME-INR - Abnormal; Notable for the following components:    INR 1.56 (*)     All other components within normal limits   GLOMERULAR FILTRATION RATE, ESTIMATED - Abnormal; Notable for the following components:    Est, Glom Filt Rate 58 (*)     All other components within normal limits   HEPATIC FUNCTION PANEL - Abnormal; Notable for the following components:    Albumin 3.3 (*)      (*)      (*)     All other components within normal limits   POCT ACTIVATED CLOTTING TIME - Abnormal; Notable for the following components:    Activated Clotting Time 251 (*)     All other components within normal limits   POCT ACTIVATED CLOTTING TIME - Abnormal; Notable for the following components:    Activated Clotting Time 474 (*)     All other components within normal limits   POCT ACTIVATED CLOTTING TIME - Abnormal; Notable for the following components:    Activated Clotting Time 450 (*)     All other components within normal limits   POCT ACTIVATED CLOTTING TIME - Abnormal; Notable for the following components:    Activated Clotting Time 444 (*)     All other components within normal limits   VRE SCREEN BY PCR   MRSA BY PCR   CULTURE, AEROBIC   CULTURE, URINE   LIPASE   ANION GAP   GLOMERULAR FILTRATION RATE, ESTIMATED   OSMOLALITY MAGNESIUM   CALCIUM, IONIZED   ANION GAP   CBC   BASIC METABOLIC PANEL   LIPID PANEL   APTT     Central line placement, I was present during the line placement. No complications. See note by Dr. Inna London. Final diagnoses:   Acute coronary syndrome ([facility] [address]   . I seen this patient with the resident Dr. Inna London and agree with her assessment and plan.      [facility], DO  03/11/23 2033

## 2023-03-11 NOTE — ED PROVIDER NOTES
[address] [facility] [address] [address] EMERGENCY DEPT      EMERGENCY MEDICINE     Pt Name: Alley Montague  MRN: [id number]  [place] 1945  Date of evaluation: 3/11/2023  Provider: Luisa Webb MD  Supervising Physician: Rafat Simmons       Chief Complaint   Patient presents with    Chest Pain     History obtained from the patient. HISTORY OF PRESENT ILLNESS   Alley Montague is a pleasant 65 y.o. female who presents to the emergency department from from home, brought in by EMS for evaluation of chest pain. Apparently chest pain started at 10:30 AM.  Gradual worsening, 6 out of 10, now 8 out of 10, also having shortness of breath, diaphoresis, nausea, vomiting, back pain. States that she has been dizzy with any movement in her chair, called EMS. Patient has no history of heart attacks, has never had a heart cath. Over the past several months, has had chest pain and shortness of breath with any exertion. Has never been seen at this hospital, does not remember what medication she takes. Denies any additional complaints. Pertinent ROS included above. PASTMEDICAL HISTORY     Past Medical History:   Diagnosis Date    COPD (chronic obstructive pulmonary disease) (HCC)     GERD (gastroesophageal reflux disease)     Hyperlipidemia     Hypertension        There is no problem list on file for this patient. SURGICAL HISTORY     History reviewed. No pertinent surgical history. CURRENT MEDICATIONS       Previous Medications    No medications on file       ALLERGIES     has No Known Allergies. FAMILY HISTORY     has no family status information on file.         SOCIAL HISTORY       Social History     Tobacco Use    Smoking status: Never     Passive exposure: Never    Smokeless tobacco: Never   Substance Use Topics    Drug use: Never       PHYSICAL EXAM       ED Triage Vitals [03/11/23 1210]   BP Temp Temp Source Heart Rate Resp SpO2 Height Weight   (!) 86/55 97.8 °F (36.6 °C) Oral 74 11 98 % 5' 5\" (1.651 m) 232 lb (105.2 kg)       Additional Vital Signs:  Vitals:    03/11/23 1243   BP: (!) 78/49   Pulse: 77   Resp: 11   Temp:    SpO2:      Physical Exam  Constitutional:       General: She is in acute distress. Appearance: She is ill-appearing and diaphoretic. HENT:      Head: Normocephalic and atraumatic. Right Ear: External ear normal.      Left Ear: External ear normal.      Nose: Nose normal.      Mouth/Throat:      Mouth: Mucous membranes are dry. Pharynx: Oropharynx is clear. Eyes:      Extraocular Movements: Extraocular movements intact. Conjunctiva/sclera: Conjunctivae normal.   Cardiovascular:      Rate and Rhythm: Normal rate and regular rhythm. Heart sounds: No murmur heard. Pulmonary:      Effort: Respiratory distress present. Breath sounds: Rales present. Abdominal:      General: Abdomen is flat. There is no distension. Palpations: Abdomen is soft. Tenderness: There is no abdominal tenderness. There is no guarding or rebound. Musculoskeletal:      Right lower leg: No edema. Left lower leg: No edema. Skin:     Capillary Refill: Capillary refill takes more than 3 seconds. Coloration: Skin is pale. Neurological:      Mental Status: She is disoriented. FORMAL DIAGNOSTIC RESULTS     RADIOLOGY: Interpretation per the Radiologist below, if available at the time of this note (none if blank):    XR CHEST PORTABLE   Final Result   1. Mild cardiomegaly with pulmonary vascular congestion suspicious for congestive heart failure. 2. Prominent pulmonary interstitium suspicious for pulmonary edema. **This report has been created using voice recognition software. It may contain minor errors which are inherent in voice recognition technology. **      Final report electronically signed by Dr. Richie Holden on 3/11/2023 1:23 PM          LABS: (none if blank)  Labs Reviewed   CBC - Abnormal; Notable for the following components:       Result Value    RBC 4.10 (*)     .0 (*)     MCHC 30.2 (*)     RDW-SD 50.8 (*)     All other components within normal limits   COMPREHENSIVE METABOLIC PANEL - Abnormal; Notable for the following components:    Glucose 168 (*)     BUN 30 (*)     All other components within normal limits   TROPONIN - Abnormal; Notable for the following components:    Troponin T 0.042 (*)     All other components within normal limits   TROPONIN - Abnormal; Notable for the following components:    Troponin T 0.041 (*)     All other components within normal limits   LIPASE   ANION GAP   GLOMERULAR FILTRATION RATE, ESTIMATED   OSMOLALITY       (Any cultures that may have been sent were not resulted at the time of this patient visit)    [address] / ED COURSE:     Assessment and Plan:   74-year-old female, history of hyperlipidemia, hypertension, obesity, previously smoker, presenting for gradually worsening chest pain since 10:30 AM. States that over the past month, has had some exertional dyspnea, has not noticed any leg swelling. Initially hypotensive on arrival. Point-of-care ultrasound shows poor EF ~10%, no tamponade or right ventricular strain. Case discussed with Dr. Yareli Servin, who agrees with having this a Cath Lab activation. EKG significant for elevations in V3 V4 V5 no reciprocal depressions. Right IJ placed, started on Levophed and fluid for increasing preload. Sent to Cath Lab. Initial troponin is 4 times up at the limit of normal. Heart score 10. Admitted to ICU via Cath Lab for cardiogenic shock with concern for STEMI     Significant Clinical Scoring:  Heart score 10         ED Reassessment: Continues to be diaphoretic, pale, hypotensive. Pressors started, central line placed.             Case discussed with consulting clinician: Cardiology, ICU         Shared Decision-Making was performed and disposition discussed with the        Patient/Family and questions answered             Code Status: Full    Central Line    Date/Time: 3/11/2023 10:56 PM  Performed by: Jhon Enrique MD  Authorized by: Sera Robertson DO     Consent:     Consent obtained:  Verbal    Consent given by:  Patient and healthcare agent    Risks, benefits, and alternatives were discussed: yes      Risks discussed:  Arterial puncture, incorrect placement, nerve damage, pneumothorax, infection and bleeding    Alternatives discussed:  No treatment  Universal protocol:     Procedure explained and questions answered to patient or proxy's satisfaction: yes      Patient identity confirmed:  Verbally with patient and arm band  Pre-procedure details:     Indication(s): central venous access      Hand hygiene: Hand hygiene performed prior to insertion      Sterile barrier technique: All elements of maximal sterile technique followed      Skin preparation:  Chlorhexidine with alcohol    Skin preparation agent: Skin preparation agent completely dried prior to procedure    Sedation:     Sedation type:  None  Anesthesia:     Anesthesia method:  Local infiltration    Local anesthetic:  Lidocaine 1% w/o epi  Procedure details:     Location:  R internal jugular    Site selection rationale:  Going to Cath Lab, likely access via groin. Left IJ not significantly large, is collapsed from what I can see    Procedural supplies:  Triple lumen    Catheter size:  7 Fr    Ultrasound guidance: yes      Ultrasound guidance timing: prior to insertion and real time      Sterile ultrasound techniques: Sterile gel and sterile probe covers were used      Number of attempts:  2    Successful placement: yes    Post-procedure details:     Post-procedure:  Line sutured and dressing applied    Assessment:  Blood return through all ports and free fluid flow (Patient went to Cath Lab immediately after completion of central line.   No pneumothorax based on exam.  Confirmed with Cath Lab nurses that they would confirm the placement of the central line via fluoroscopy once they arrived to Cath Lab.)    Procedure completion:  Tolerated    Medical Decision Making  Problems Addressed:  Acute coronary syndrome [facility]): acute illness or injury    Amount and/or Complexity of Data Reviewed  Independent Historian: caregiver  External Data Reviewed: labs and notes. Labs: ordered. Radiology: ordered and independent interpretation performed. ECG/medicine tests: ordered and independent interpretation performed. Risk  Prescription drug management. Drug therapy requiring intensive monitoring for toxicity. Decision regarding hospitalization. Emergency major surgery. Critical Care  Total time providing critical care: 30-74 minutes        Vitals Reviewed:    Vitals:    03/11/23 1210 03/11/23 1233 03/11/23 1243   BP: (!) 86/55 85/63 (!) 78/49   Pulse: 74 82 77   Resp: 11 15 11   Temp: 97.8 °F (36.6 °C)     TempSrc: Oral     SpO2: 98% 96%    Weight: 232 lb (105.2 kg)     Height: 5' 5\" (1.651 m)         The patient was seen and examined. Appropriate diagnostic testing was performed and results reviewed with the patient. Nursing notes reviewed. The results of pertinent diagnostic studies and exam findings were discussed. The patients provisional diagnosis and plan of care were discussed with the patient and present family who expressed understanding. Any medications were reviewed and indications and risks of medications were discussed with the patient /family present. ED Medications administered this visit:  (None if blank)  Medications   norepinephrine (LEVOPHED) 16 mg in sodium chloride 0.9 % 250 mL infusion (5 mcg/min IntraVENous New Bag 3/11/23 1246)   0.9 % sodium chloride bolus (1,000 mLs IntraVENous New Bag 3/11/23 1237)   heparin (porcine) injection 5,000 Units (has no administration in time range)         DISCHARGE PRESCRIPTIONS: (None if blank)  New Prescriptions    No medications on file       FINAL IMPRESSION      1.  Acute coronary syndrome (HCC)          DISPOSITION/PLAN   Condition: condition: serious  Dispo: Admit to cath lab      This transcription was electronically signed. Parts of this transcriptions may have been dictated by use of voice recognition software and electronically transcribed, and parts may have been transcribed with the assistance of an ED scribe. The transcription may contain errors not detected in proofreading. Please refer to my supervising physician's documentation if my documentation differs.     Electronically Signed: Lea Stallworth MD, 03/11/23, 1:29 Ian Wynn MD  Resident  03/11/23 3210       Lea Stallworth MD  Resident  03/11/23 3011

## 2023-03-11 NOTE — ED NOTES
Dr. Inna London at bedside to place a central line at this time.       Jocelyn Andrews RN  03/11/23 3749

## 2023-03-11 NOTE — H&P
[facility]  Interventional Cardiology History and Physical       Patient:  Aura Ibanez  YOB: 1945    MRN: [id number]   Acct: [de-identified]     Primary Care Physician: None None    CHIEF COMPLAINT:    Chest pain     HISTORY OF PRESENT ILLNESS:    Aura Ibanez is a pleasant 65year old female patient with past medical history that includes:   Past Medical History:   Diagnosis Date    COPD (chronic obstructive pulmonary disease) ([address]     GERD (gastroesophageal reflux disease)     Hyperlipidemia     Hypertension    The patient denies any known h/o cardiac disease. She denies tobacco or alcohol abuse. Today, at around 10:30 am, patient developed sudden onset, 7/10, retrosternal chest pain, pressure like, not radiating and associated with shortness of breath and anxiety. Patient denies dizziness, syncope, recent weight gain or leg swelling. EKG in ER revealed SR, anteroseptal infarct, ST elevations in leads V2-V4. I was called by Dr Marina Ferris and received a copy of EKG at 12:33 pm, patient was reported to have continued chest pains EKG was reviewed and case was d/w Dr Marina Ferris, STEMI alert was activated. In the ER, patient was hypotensive, in shock, with SBP in 80s. Central line was placed and patient was started on Levophed gtt. Upon my assessment, patient reported continued 6/10 chest pain. LHC revealed severe multivessel CAD with culprit for STEMI in LM/Proximal LAD. She continued to be hypotensive, Levophed gtt dose was increased to 10 mcg/min. LVEDP 29 mmHg. EF was severely reduced, ~ 10%. In setting of cardiogenic shock, multivessel CAD, CV surgery was consulted, heart team discussion pursued. The case was discussed with Dr Yomaira Hernandez who kindly came to the cath lab, reviewed the angiogram/Lvgram and clinical presentation. Per CV surgery, the patient had prohibitive risk for CABG and salvage PCI was recommended.  She is now s/p complex PCI with Impella support, details per procedure note. All labs, EKG's, diagnostic testing and images as well as cardiac cath, stress testing   were reviewed during this encounter    CARDIAC TESTING  Reviewed     Past Medical History:    Past Medical History:   Diagnosis Date    COPD (chronic obstructive pulmonary disease) ([address]     GERD (gastroesophageal reflux disease)     Hyperlipidemia     Hypertension        Past Surgical History:    History reviewed. No pertinent surgical history. Medications Prior to Admission:    No medications prior to admission. Allergies:    Patient has no known allergies. Social History:    reports that she has never smoked. She has never been exposed to tobacco smoke. She has never used smokeless tobacco. She reports that she does not use drugs. Family History:   family history is not on file. REVIEW OF SYSTEMS:  Constitutional: negative for anorexia, chills and fevers,weight change  Skin: negative for new skin rash per patient  HEENT: negative for head trauma or new visual changes  Respiratory: negative for cough, hemoptysis, wheezing  Cardiovascular: negative for  orthopnea, palpitations and syncope. Gastrointestinal: negative for abdominal pain,nausea , vomiting, constipation, diarrhea. Hematologic/lymphatic: negative for bruising,prolonged bleeding,blood clots  Musculoskeletal:negative for muscle weakness, myalgias,wasting  Neurological: negative for coordination problems, dizziness, gait problems and vertigo  Behavioral/Psych:negative for mood/sleep disturbance      PHYSICAL EXAM:   Vitals:Patient Vitals for the past 24 hrs:   BP Temp Temp src Pulse Resp SpO2 Height Weight   03/11/23 1243 (!) 78/49 -- -- 77 11 -- -- --   03/11/23 1233 85/63 -- -- 82 15 96 % -- --   03/11/23 1210 (!) 86/55 97.8 °F (36.6 °C) Oral 74 11 98 % 5' 5\" (1.651 m) 232 lb (105.2 kg)       Last 3 weights:    Wt Readings from Last 3 Encounters:   03/11/23 232 lb (105.2 kg)     24 hour intake/output:No intake or output data in the 24 hours ending 03/11/23 1626  BMI:Body mass index is 38.61 kg/m². General Appearance: alert and oriented to person, place and time, well developed and well- nourished, in no acute distress  Skin: warm and dry, no rash or erythema  Eyes: pupils equal, round, and reactive to light, extraocular eye movements intact, conjunctivae normal  Neck: supple and non-tender without mass, no thyromegaly or thyroid nodules, no cervical lymphadenopathy  Pulmonary/Chest: clear to auscultation bilaterally- no wheezes, rales or rhonchi, normal air movement, no respiratory distress  Cardiovascular: normal rate, regular rhythm, normal S1 and S2, systolic murmur. No rubs, clicks, or gallops, distal pulses intact, no carotid bruits, Negative JVD  Radial Pulses: intact 2+  Abdomen: soft, non-tender, non-distended, normal bowel sounds, no masses or organomegaly  Extremities: no cyanosis, clubbing . no Edema. Pulses in legs are faint with cold feet at baseline   Musculoskeletal: normal range of motion, no joint swelling, deformity or tenderness      RADIOLOGY   XR CHEST PORTABLE    Result Date: 3/11/2023  PROCEDURE: XR CHEST PORTABLE CLINICAL INFORMATION: Chest pain TECHNIQUE: Mobile AP chest radiograph. COMPARISON: None FINDINGS: There is mild enlargement of the cardiac silhouette. Pulmonary vessels are congested. Pulmonary interstitium is prominent. Degenerative changes in the thoracic spine are poorly visualized. The right hemidiaphragm is elevated. There are surgical  clips in the right upper abdomen. 1. Mild cardiomegaly with pulmonary vascular congestion suspicious for congestive heart failure. 2. Prominent pulmonary interstitium suspicious for pulmonary edema. **This report has been created using voice recognition software. It may contain minor errors which are inherent in voice recognition technology. ** Final report electronically signed by Dr. Suzy Maier on 3/11/2023 1:23 PM      LABS:  Recent Labs     03/11/23  8056 TROPONINT 0.041*  0.042*     CBC:   Lab Results   Component Value Date/Time    WBC 9.1 03/11/2023 12:36 PM    RBC 4.10 03/11/2023 12:36 PM    HGB 12.5 03/11/2023 12:36 PM    HCT 41.4 03/11/2023 12:36 PM    .0 03/11/2023 12:36 PM    MCH 30.5 03/11/2023 12:36 PM    MCHC 30.2 03/11/2023 12:36 PM     03/11/2023 12:36 PM    MPV 11.9 03/11/2023 12:36 PM     BMP:    Lab Results   Component Value Date/Time     03/11/2023 12:36 PM    K 4.9 03/11/2023 12:36 PM     03/11/2023 12:36 PM    CO2 24 03/11/2023 12:36 PM    BUN 30 03/11/2023 12:36 PM    LABALBU 3.9 03/11/2023 12:36 PM    CREATININE 0.8 03/11/2023 12:36 PM    CALCIUM 9.2 03/11/2023 12:36 PM    LABGLOM >60 03/11/2023 12:36 PM    GLUCOSE 168 03/11/2023 12:36 PM     Hepatic Function Panel:    Lab Results   Component Value Date/Time    ALKPHOS 66 03/11/2023 12:36 PM    ALT 21 03/11/2023 12:36 PM    AST 26 03/11/2023 12:36 PM    PROT 6.3 03/11/2023 12:36 PM    BILITOT 0.5 03/11/2023 12:36 PM    LABALBU 3.9 03/11/2023 12:36 PM     Magnesium:  No results found for: MG  Warfarin PT/INR:  No components found for: PTPATWAR, PTINRWAR  HgBA1c:  No results found for: LABA1C  FLP:  No results found for: TRIG, HDL, LDLCALC, LDLDIRECT, LABVLDL  TSH:  No results found for: TSH  BNP: No results found for: BNP    ASSESSMENT:  STEMI  Multivessel CAD  S/p complex Impella-facilitated PCI   Cardiogenic shock  S/p Impella placement   Peripheral arterial disease     RECOMMENDATIONS:  Strict bed rest   Admit overnight to ICU  Closely monitor blood pressure  Monitor urine output  Goal MAP is>65 mmHg  Antiplatelet therapy: ASA 81 mg PO daily and Brilinta 90 mg po BID   High intensity statin therapy  Obtain a Full Echocardiogram on Monday   Repeat EKG on the floor   AM Labs: BMP, CBC, Lipid panel   Access site care  The patient had PAD with weak peripheral pulses at baseline.  RLE peripheral angiogram post Impella placement revealed maintained flow with slow flow in setting of cardiogenic shock, PAD and vasospasm  Levophed was stopped in effort to limit peripheral vasoconstriction. Blood pressure remained stable, MAP in upper 80s   Monitor fluid intake and output, maintain a negative fluid balance  Fluid restriction to less than 2 liters per day  Na restriction to less than 2,000 mg per day  Daily weight  Monitor electrolytes, keep K>4 and Mg>2  Daily BMP  GDMT for HFrEF to be gradually initiated as shock resolved and BP allows   Lifevest is indicated prior to discharge     Above findings and plan of care were discussed with patient and her family, questions were answered, agreeable to plan.        Sadie Ellis MD, Sheila Grover   4:26 PM  3/11/2023

## 2023-03-11 NOTE — PROGRESS NOTES
Called to cath lab for a pt with 3 vessel dz and EF of 10%. Impella was placed and discussion with Cardiologist concluded with pt would not survive in the operating room and recommendation was to proceed with high risk stenting.

## 2023-03-11 NOTE — ED TRIAGE NOTES
Pt presents to the ED from home with complaints of chest pain that started around 1030 this morning. Per report EMS gave pt 4 mg of Zofran and 2, 2 mg of morphine IV. Pt states she does have a hx of GERD and pt states it felt like that at first so she took a tums however there was no relief. Pt rates chest pain a 6/10 now. Pt is alert and oriented x4 with respirations even and unlabored.

## 2023-03-11 NOTE — H&P
[facility]  Sedation/Analgesia History & Physical    Pt Name: David Cheng  Account number: [de-identified]  MRN: [id number]  YOB: 1945  Provider Performing Procedure: Madonna Mckenzie MD MD  Referring Provider: Artem Gates DO   Primary Care Physician: Calvin Simpson  Date: 3/11/2023    PRE-PROCEDURE    Code Status: FULL CODE  Brief History/Pre-Procedure Diagnosis:   STEMI  Consent: : I have discussed with the patient risks, benefits, and alternatives (and relevant risks, benefits, and side effects related to alternatives or not receiving care), and likelihood of the success. The patient and/or representative appear to understand and agree to proceed. The discussion encompasses risks, benefits, and side effects related to the alternatives and the risks related to not receiving the proposed care, treatment, and services. The indication, risks and benefits of the procedure and possible therapeutic consequences and alternatives were discussed with the patient. The patient was given the opportunity to ask questions and to have them answered to his/her satisfaction. Risks of the procedure include but are not limited to the following: Bleeding, hematoma including retroperitoneal hemmorhage, infection, pain and discomfort, injury to the aorta and other blood vessels, rhythm disturbance, low blood pressure, myocardial infarction, stroke, kidney damage/failure, myocardial perforation, allergic reactions to sedatives/contrast material, loss of pulse/vascular compromise requiring surgery, aneurysm/pseudoaneurysm formation, possible loss of a limb/hand/leg, needing blood transfusion, requiring emergent open heart surgery or emergent coronary intervention, the need for intubation/respiratory support, the requirement for defibrillation/cardioversion, and death. Alternatives to and omission of the suggested procedure were discussed.  The patient had no further questions and wished to proceed; the consent form was signed. MEDICAL HISTORY  []ASHD/ANGINA/MI/CHF   []Hypertension  []Diabetes  []Hyperlipidemia  []Smoking  []Family Hx of ASHD  []Additional information:       has a past medical history of COPD (chronic obstructive pulmonary disease) ([address] GERD (gastroesophageal reflux disease), Hyperlipidemia, and Hypertension. SURGICAL HISTORY   has no past surgical history on file. Additional information:       ALLERGIES   Allergies as of 03/11/2023    (No Known Allergies)     Additional information:       MEDICATIONS   Aspirin  [] 81 mg  [] 325 mg  [] None  Antiplatelet drug therapy use last 5 days  []No []Yes  Coumadin Use Last 5 Days []No []Yes  Other anticoagulant use last 5 days  []No []Yes    Current Facility-Administered Medications:     norepinephrine (LEVOPHED) 16 mg in sodium chloride 0.9 % 250 mL infusion, 1-100 mcg/min, IntraVENous, Continuous, Rica Rowe MD, Last Rate: 4.7 mL/hr at 03/11/23 1246, 5 mcg/min at 03/11/23 1246    0.9 % sodium chloride bolus, 1,000 mL, IntraVENous, Once, Rica Rowe MD, Last Rate: 495.9 mL/hr at 03/11/23 1237, 1,000 mL at 03/11/23 1237    heparin (porcine) injection 5,000 Units, 5,000 Units, IntraVENous, Once, Rica Rowe MD  No current outpatient medications on file. Prior to Admission medications    Not on File     Additional information:       VITAL SIGNS   Vitals:    03/11/23 1243   BP: (!) 78/49   Pulse: 77   Resp: 11   Temp:    SpO2:        PHYSICAL:   General: No acute distress  HEENT:  Unremarkable for age  Neck: without increased JVD, carotid pulses 2+ bilaterally without bruits  Heart: RRR, S1 & S2 WNL.  systolic murmur    Lungs: Clear to auscultation    Abdomen: BS present, without HSM, masses, or tenderness    Extremities: without C,C,E.  Pulses 2+ bilaterally   Mental Status: Alert & Oriented        PLANNED PROCEDURE   [x]Cath  [x]PCI                []Pacemaker/AICD  []TAMAR             []Cardioversion []Peripheral angiography/PTA  []Other:      SEDATION  Planned agent:[x]Midazolam []Meperidine []Sublimaze []Morphine  []Diazepam  []Other:     ASA Classification:  []1 []2 []3 [x]4 []5   Class 1: A normal healthy patient  Class 2: Pt with mild to moderate systemic disease  Class 3: Severe systemic disease or disturbance  Class 4: Severe systemic disorders that are already life threatening. Class 5: Moribund pt with little chances of survival, for more than 24 hours. Mallampati I Airway Classification:   []1 []2 []3 [x]4     [x]Pre-procedure diagnostic studies complete and results available. Comment:    [x]Previous sedation/anesthesia experiences assessed. Comment:    [x]The patient is an appropriate candidate to undergo the planned procedure sedation and anesthesia. (Refer to nursing sedation/analgesia documentation record)  [x]Formulation and discussion of sedation/procedure plan, risks, and expectations with patient and/or responsible adult completed. [x]Patient examined immediately prior to the procedure.  (Refer to nursing sedation/analgesia documentation record)      Diane Valdivia MD, Anson Garcia    Electronically signed 3/11/2023 at 1:22 PM

## 2023-03-11 NOTE — FLOWSHEET NOTE
1700 Patient arrived to unit from cath lab via bed. Patient transferred to ICU bed and placed on continuous ICU bedside monitor. Patient admitted for Acute coronary syndrome (HCC) [I24.9]  STEMI (ST elevation myocardial infarction) ([address] [I21.3]. Vitals obtained. See flowsheets. Patient's IV access includes right shoulder, right IJ cvc triple lumen. Current infusions and rates of infusion include Normal saline at 75 ml/hr. Assessment completed by Christen Flynn. Two nurse skin assessment completed by Christen Flynn and Flower Devlin. See flowsheets for assessment details. Policies and procedures of ICU able to be explained to patient at this time. Family member(s)/representative(s) present at time of admission include none. Patient rights explained to family member(s)/representatives and patient, as able. All questions posed by patient's family member(s)/representative(s) and patient answered at this time. 1706 Pt changed from NRB mask to nasal cannula at 5 liters. [facility] updated per phone and orders received. 1800 - 14 Fr burrows catheter placed with difficulty. 26 - Dr. Arellano Alert notified of SBP in 80s. MAP continues 75. Clarified fluid order. Also notified of pink tinged urine. Waiting for response. 1915 - Discussed hemodynamics, urine output and bleeding at [facility] with Dr. Arellano Alert. new orders received. 1920 - bedside report given to Ladan Arellano Alert updated that patient is having significant pain. 1924 - orders for prn Vertie Peasant - Dr. Arellano Alert notified that patient is extremely anxious and fidgety. 1934 - Telephone orders for versed 1 mg once    1936 - Dr. Arellano Alert notified of vomiting. Telephone orders for zofran prn q 4 and 1 mg morphine once. 1946 - aspiration precautions per Dr. Arellano Alert.

## 2023-03-12 NOTE — PROCEDURES
Intubation    Date/Time: 3/12/2023 12:33 AM  Performed by: Kyra Haas MD  Authorized by: Kyra Haas MD   Consent: The procedure was performed in an emergent situation. Required items: required blood products, implants, devices, and special equipment available  Indications: airway protection and respiratory failure  Intubation method: video-assisted  Patient status: sedated  Preoxygenation: BVM  Sedatives: fentanyl (and versed)  Paralytic: none  Laryngoscope size: s-4. Tube size: 7.5 mm  Tube type: cuffed  Number of attempts: 3  Cricoid pressure: no  Cords visualized: yes  Post-procedure assessment: chest rise and CO2 detector  Breath sounds: equal  Cuff inflated: yes  ETT to lip: 24 cm  Tube secured with: ETT martinez  Chest x-ray interpreted by: Chest x-ray delayed because of emergent need to go to Cath Lab. ET tube will be adjusted by respiratory at interventional cardiologist discretion with visualization under fluoroscopy. Comments: Patient intubated for airway protection and respiratory failure. Unable to get good pulse oximetry however patient was developing cyanosis and blood gas did demonstrate low PaO2. Patient was going to Cath Lab for procedure and request was made to intubate prior to transport. Patient was initially sedated with fentanyl and Versed. Patient was still awake and fighting intubation with gag intact additional dose of Versed was given for which patient began to relax. Was able to visualize the cords. Patient had a very anterior trachea. Initial attempt with a hyper angulated stylette was unable to pass. Given the absence of a pulse oximetry reading with through the attempt and bag mask for approximately 2 minutes to reoxygenate. Second attempt was made but was still unable to pass the hyper angulated stylette through the vocal cords. Patient was then bagged mask again.   Bougie was then inserted on the third attempt through the vocal cords and ET tube was passed over the bougie successfully intubating the patient. Patient was transferred emergently to the Cath Lab. We did have bilateral breath sounds, color change on the end-tidal CO2 detector, as well as fogging in the tube. Absence of breath sounds over the epigastrium. I am confident that the ET tube at this point is in the trachea and I am okay transported to the Cath Lab. Approximately 5 minutes after the patient was intubated while we were preparing to transport to the Cath Lab patient did go into PEA arrest and CPR was initiated. Please see consult note regarding CPR. ROSC was ultimately achieved and patient was transported to the Cath Lab.

## 2023-03-12 NOTE — H&P
[facility]  Sedation/Analgesia History & Physical    Pt Name: Armani Quarles  Account number: [de-identified]  MRN: [id number]  YOB: 1945  Provider Performing Procedure: Ata Reynolds MD MD  Referring Provider: Ata Reynolds MD   Primary Care Physician: None None  Date: 3/12/2023    PRE-PROCEDURE    Code Status: FULL CODE  Brief History/Pre-Procedure Diagnosis:   Cardiogenic shock, PEA cardiac arrest, pericardial effusion   Notice: emergency consent obtained from patient's family   Details in procedure note    Consent: : I have discussed with the patient risks, benefits, and alternatives (and relevant risks, benefits, and side effects related to alternatives or not receiving care), and likelihood of the success. The patient and/or representative appear to understand and agree to proceed. The discussion encompasses risks, benefits, and side effects related to the alternatives and the risks related to not receiving the proposed care, treatment, and services. The indication, risks and benefits of the procedure and possible therapeutic consequences and alternatives were discussed with the patient. The patient was given the opportunity to ask questions and to have them answered to his/her satisfaction.  Risks of the procedure include but are not limited to the following: Bleeding, hematoma including retroperitoneal hemmorhage, infection, pain and discomfort, injury to the aorta and other blood vessels, rhythm disturbance, low blood pressure, myocardial infarction, stroke, kidney damage/failure, myocardial perforation, allergic reactions to sedatives/contrast material, loss of pulse/vascular compromise requiring surgery, aneurysm/pseudoaneurysm formation, possible loss of a limb/hand/leg, needing blood transfusion, requiring emergent open heart surgery or emergent coronary intervention, the need for intubation/respiratory support, the requirement for defibrillation/cardioversion, and death. Alternatives to and omission of the suggested procedure were discussed. The patient had no further questions and wished to proceed; the consent form was signed. MEDICAL HISTORY  []ASHD/ANGINA/MI/CHF   []Hypertension  []Diabetes  []Hyperlipidemia  []Smoking  []Family Hx of ASHD  []Additional information:       has a past medical history of COPD (chronic obstructive pulmonary disease) ([address] GERD (gastroesophageal reflux disease), Hyperlipidemia, and Hypertension. SURGICAL HISTORY   has no past surgical history on file.   Additional information:       ALLERGIES   Allergies as of 03/11/2023    (No Known Allergies)     Additional information:       MEDICATIONS   Aspirin  [] 81 mg  [] 325 mg  [] None  Antiplatelet drug therapy use last 5 days  []No []Yes  Coumadin Use Last 5 Days []No []Yes  Other anticoagulant use last 5 days  []No []Yes    Current Facility-Administered Medications:     heparin (porcine) injection 5,000 Units, 5,000 Units, IntraVENous, Once, Krystle Azevedo MD    sodium chloride flush 0.9 % injection 5-40 mL, 5-40 mL, IntraVENous, 2 times per day, Bev Ruiz MD    sodium chloride flush 0.9 % injection 5-40 mL, 5-40 mL, IntraVENous, PRN, Fredis Call MD    0.9 % sodium chloride infusion, , IntraVENous, PRN, Bev Ruiz MD    heparin (porcine) 12,500 Units in dextrose 5 % 500 mL infusion (FOR IMPELLA PURGE), , IntraCATHeter, Continuous, Bev Ruiz MD, New Bag at 03/11/23 1827    rosuvastatin (CRESTOR) tablet 40 mg, 40 mg, Oral, Nightly, Fredis Call MD    aspirin EC tablet 81 mg, 81 mg, Oral, Daily, Fredis Call MD    ticagrelor (BRILINTA) tablet 90 mg, 90 mg, Oral, BID, Fredis Call MD    0.9 % sodium chloride infusion, , IntraVENous, Continuous, Fredis Call MD, Last Rate: 50 mL/hr at 03/1944, New Bag at 03/1944    norepinephrine (LEVOPHED) 16 mg in sodium chloride 0.9 % 250 mL infusion, 1-100 mcg/min, IntraVENous, Continuous, Bev Ruiz MD, Last Rate: 4.7 mL/hr at 03/11/23 1921, 5 mcg/min at 03/11/23 1921    HYDROcodone-acetaminophen (NORCO) 5-325 MG per tablet 1 tablet, 1 tablet, Oral, Q4H PRN **OR** HYDROcodone-acetaminophen (NORCO) 5-325 MG per tablet 2 tablet, 2 tablet, Oral, Q4H PRN, Meche Workman MD    ondansetron (ZOFRAN) injection 4 mg, 4 mg, IntraVENous, Q4H, Fredis Call MD, 4 mg at 03/11/23 2022    magnesium sulfate injection 1,000 mg, 1,000 mg, IntraMUSCular, Once, Meche Workman MD    metoclopramide (REGLAN) injection 10 mg, 10 mg, IntraVENous, Once, Meche Workman MD    0.9 % sodium chloride bolus, 200 mL, IntraVENous, Once, Meche Workman MD    midazolam (VERSED) 1 mg/mL in NS infusion, 1-10 mg/hr, IntraVENous, Continuous, Yaw Estevez MD    fentaNYL (SUBLIMAZE) 100 MCG/2ML injection, , , ,     midazolam (VERSED) 2 MG/2ML injection, , , ,     sodium bicarbonate 8.4 % injection, , , ,     0.9 % sodium chloride infusion, , IntraVENous, PRN, Meche Workman MD  Prior to Admission medications    Not on File     Additional information:       VITAL SIGNS   Vitals:    03/11/23 2143   BP:    Pulse:    Resp:    Temp: 97.4 °F (36.3 °C)   SpO2:        PHYSICAL:   General: sedated   HEENT:  intubated   Neck: increased JVD, carotid pulses 2+ bilaterally without bruits  Heart: RRR, S1 & S2 WNL. murmur    Lungs: Clear to auscultation    Abdomen: BS present, without HSM, masses, or tenderness    Extremities: without C,C,E.   Mental Status: Sedated         PLANNED PROCEDURE   [x]Cath  [x]PCI                []Pacemaker/AICD  []TAMAR             []Cardioversion []Peripheral angiography/PTA  [x]Other: impella repositioning   Pericardiocentesis      SEDATION  Planned agent:[x]Midazolam []Meperidine []Sublimaze []Morphine  []Diazepam  []Other:     ASA Classification:  []1 []2 []3 [x]4 []5   Class 1: A normal healthy patient  Class 2: Pt with mild to moderate systemic disease  Class 3: Severe systemic disease or disturbance  Class 4: Severe systemic disorders that are already life threatening. Class 5: Moribund pt with little chances of survival, for more than 24 hours. Mallampati I Airway Classification:   []1 []2 []3 [x]4     [x]Pre-procedure diagnostic studies complete and results available. Comment:    [x]Previous sedation/anesthesia experiences assessed. Comment:    [x]The patient is an appropriate candidate to undergo the planned procedure sedation and anesthesia. (Refer to nursing sedation/analgesia documentation record)  [x]Formulation and discussion of sedation/procedure plan, risks, and expectations with patient and/or responsible adult completed. [x]Patient examined immediately prior to the procedure.  (Refer to nursing sedation/analgesia documentation record)      Michael Sigala MD, Radha Krueger    Electronically signed 3/12/2023 at 12:36 AM

## 2023-03-12 NOTE — CONSULTS
CRITICAL CARE- History & Physical      Patient: Armani Quarles    Unit/Bed:4D-12/012-A  YOB: 1945  MRN: [id number]   Acct: [de-identified]   PCP: None None    Date of Service: Pt seen/examined on 03/12/23  and Admitted to Inpatient with expected LOS greater than two midnights due to medical therapy. Chief Complaint:  Chest Pain    Assessment and Plan:-  Cardiac Arrest s/p ROSC: Cardiac arrest prior to going to Cath Lab. Significant pericardial effusion identified on bedside echo with cardiologist present. Patient arrested just prior to transport after being intubated. 2 rounds of CPR with epinephrine were performed and 1 dose of bicarb given before achieving ROSC. Rhythms were PEA both times. Will appreciate further recommendations from interventional cardiology, will maintain blood pressures with MAP goal greater than 65. STEMI: Status post PCI with Impella placement. Patient with significant shock and low blood pressure requiring Levophed. Cardiology okayed for aspirin and Brilinta postoperative from the first procedure. Will reach out to cardiology after patient returns from Cath Lab to ensure that this is still the plan. Multivessel coronary artery disease: Identified during PCI. Patient will need high intensity statin. Cardiogenic shock: Secondary to above. Currently on Levophed with Impella. Goal is to maintain MAP greater than 65. Requested swab replaced by interventional cardiologist following return to Cath Lab. We will continue to monitor blood pressures. Patient is to be fluid restricted to less than 2 L/day as well as sodium restricted to less than 2 g/day. Patient will need daily weights. Status post Impella: We will continue to monitor peripheral and aortic pressures, interventional cardiology is primary. We will have them manage. Hyperkalemia: Potassium elevated from 4.9-5.3 since first procedure. We will continue to monitor patient's potassium.   Patient given mag and calcium during cardiac arrest.  Will shift patient with insulin. Hyperglycemia: Patient's blood glucose 303, will order insulin sliding scale. Patient not on insulin at home. Will get A1c to evaluate need for long-acting insulin. Could be acutely elevated secondary to disease illness. Transaminitis: Believed to be secondary to shock liver given the cardiogenic shock. We will continue to monitor. Leukocytosis: Acute elevation from 9.1-16.4 believed to be reactive secondary to procedure earlier in the day. We will continue to monitor. Patient afebrile. No antibiotics at this time. Peripheral artery disease: History of  COPD: Patient without any wheezing at this time. No medication history identified. Patient will need LAMA LABA. Continue to monitor. History Of Present Illness:    Patient is a 21-year-old female who presented to the emergency department from home via EMS for evaluation of acute chest pain that started approximately 10:30 in the morning was gradual and worsening accompanied with shortness of breath diaphoresis nausea, vomiting, dizziness. Patient denies history of cardiac disease, tobacco use or alcohol abuse. Patient was activated as a STEMI alert upon arrival to the emergency department. A left heart cath was performed which revealed severe multivessel coronary artery disease with the inciting lesion in the proximal LAD. Patient continued to be hypotensive requiring Levophed drip. Conversation was had between Dr. Dwayne Carlos and Dr. Radha Fernandez. Patient was determined to be high risk CABG and best option was salvage PCI. Impella was placed into the patient and the patient was transferred to ICU for monitoring and support. We were consulted tonight approximately 9:30 PM.  Patient's blood pressure was cycling down and the Impella was currently on a PEEP of 6. Gentle hydration this with 200 cc of IV fluids were ordered.   Dr. Dwayne Carlos ordered stat echo and was bedside with the patient. A large pericardial effusion was identified without any evidence of tamponade. Impella was manipulated bedside with the echo with improvement in pressures. Patient had worsening mental status and Dr. Christina Singh requested that the patient be intubated prior to going down to the Cath Lab. Please see separate intubation note regarding procedure. Approximately 10 minutes after intubation, patient's pressures began to drop even further. Patient's heart rate had also dropped from 100s down to the 40s. No palpable pulse was identified and CPR was started. Patient received 2 rounds of CPR with 2 doses of epinephrine and 1 dose of bicarb before ROSC was obtained. During pulse checks patient had PEA both times. Patient was then immediately transferred to the Cath Lab for pericardiocentesis, [place] catheter placement and other interventions per interventional cardiology, the primary team.    Past Medical History:        Diagnosis Date    COPD (chronic obstructive pulmonary disease) ([facility] [address]     GERD (gastroesophageal reflux disease)     Hyperlipidemia     Hypertension        Past Surgical History:    History reviewed. No pertinent surgical history. Home Medications:   No current facility-administered medications on file prior to encounter. No current outpatient medications on file prior to encounter. Allergies:    Patient has no known allergies. Social History:    reports that she has never smoked. She has never been exposed to tobacco smoke. She has never used smokeless tobacco. She reports that she does not use drugs. Family History:   History reviewed. No pertinent family history. Diet:  ADULT DIET; Regular; Low Sodium (2 gm); 2000 ml    Review of systems:   Pertinent positives as noted in the HPI. All other systems reviewed and negative.     PHYSICAL EXAMINATION:  Patient Vitals for the past 8 hrs:   BP Temp Temp src Pulse SpO2   03/11/23 2143 -- 97.4 °F (36.3 °C) Oral -- -- 03/11/23 2000 -- -- -- 82 (!) 87 %   03/11/23 1930 -- -- -- 73 --   03/11/23 1915 -- -- -- 75 --   03/11/23 1900 -- -- -- 78 100 %   03/11/23 1845 -- -- -- 87 --   03/11/23 1830 -- -- -- 84 --   03/11/23 1815 -- -- -- 84 96 %   03/11/23 1800 126/65 -- -- 85 100 %   03/11/23 1745 -- -- -- 90 100 %   03/11/23 1730 -- -- -- 93 100 %   03/11/23 1715 -- -- -- 91 99 %   03/11/23 1700 (!) 118/98 97.9 °F (36.6 °C) Oral 96 100 %     No intake/output data recorded. Wt Readings from Last 3 Encounters:   03/11/23 232 lb (105.2 kg)      Body mass index is 38.61 kg/m². General:   Lying in bed, moaning, rapid respiration,  HEENT:  normocephalic and atraumatic. No scleral icterus. PERR dry mucous membranes  Neck: supple. No Thyromegaly. Right IJ central venous catheter in place  Lungs: clear to auscultation. No retractions  Cardiac: Tachycardic. Mild JVD  Abdomen: soft. Nontender. Extremities: Peripheral cyanosis in all 4 extremities, absent palpable pulse in the right lower foot, introducer sheath for Impella in right groin, small amount of bleeding around introducer site  Vasculature: Cap refill moderately delayed at greater than 3 seconds  Skin: Cool, dry  Psych:  Alert and oriented x3. Affect appropriate  Neurologic:  No focal deficit. No seizures. Data: (All radiographs, tracings, PFTs, and imaging are personally viewed and interpreted unless otherwise noted).    Hyperkalemic at 5.3  Kidney function at baseline with creatinine of 1.0  Hyperglycemic to 303  Troponin elevated at 0.041 believed to be secondary to STEMI  Elevated ALT AST to 1 ratio, consider shock liver, no history of alcohol abuse  Leukocytosis of 16.4  CURRENT PARENTERAL VASOACTIVE / INOTROPIC AGENTS:  [] None Vasopressors:  [x] Norepinephrine  [] Dopamine  [] Phenylephrine  [] Vasopressin  [] Epinephrine  [] Other: Sedation:  [] No Sedation  [] Propofol gtt-    [] BZD gtt -    [] Opiate gtt  -    [] Dexmedetomidine  [] Paralytics Antihypertensives gtt  [] Ca Channel Antagonist:  [] Beta-blocker:  [] Nitroglycerin  [] Nitroprusside     SUPPORT DEVICES:  [x] ETT   MODE:-  [x]PRVC           []CPAP             []BILEVEL-PAP   FiO2 100%, PEEP 6, Respiratory Rate 16, Tidal Volume 400     Additional Respiratory Assessments  Heart Rate: 82  Resp: 11  SpO2: (!) 87 %  Oxygen Delivery -    ABGs:   Lab Results   Component Value Date/Time    PH 7.30 03/11/2023 10:09 PM    PCO2 23 03/11/2023 10:09 PM    PO2 163 03/11/2023 10:09 PM    HCO3 11 03/11/2023 10:09 PM    O2SAT 99 03/11/2023 10:09 PM     Lab Results   Component Value Date/Time    IFIO2 6 03/11/2023 10:09 PM         CENTRAL LINES/CHEMOPORT/TUNNEL CATH:-    [] No   [x] Yes   (Date )           If yes -    [x] Right IJ   [] Left IJ [x] Right Femoral [] Left Femoral     [] Right Subclavian [] Left Subclavian  [x] Peripheral IV access  [] Arterial Line (Specify Site)    LINTON CATHETER:-   [] No  [x] Yes  (Date  )     ICU PROPHYLAXIS/THERAPY:   Stress ulcer:  [] PPI Agent  [] H2RA [] Sucralfate [] Other:   VTE:     [] Enoxaparin    [] Warfarin [] NOAC [] PCD Device:Bilat LE   [] Heparin: [] Subcut / [] IV     LABS/RADIOLOGY:-  Recent Labs     03/11/23  1236 03/11/23  1815 03/11/23  2100   WBC 9.1 16.4*  --    HGB 12.5 11.7* 11.2*   HCT 41.4 36.7* 35.6*    159  --      Recent Labs     03/11/23  1236 03/11/23  1815    133*   K 4.9 5.3*    102   CO2 24 21*   BUN 30* 34*   CREATININE 0.8 1.0   CALCIUM 9.2 8.8   PHOS  --  5.2*     Recent Labs     03/11/23  1236 03/11/23  1815   AST 26 218*   ALT 21 100*   BILIDIR  --  <0.2   BILITOT 0.5 0.8   ALKPHOS 66 85     Recent Labs     03/11/23  1815   INR 1.56*     No results for input(s): Aleene Sauger in the last 72 hours. No results for input(s): PROCAL in the last 72 hours. No results for input(s): LACTA in the last 72 hours.    Microbiology:    Blood culture #1: No results found for: [facility]  Blood culture #2:No results found for: [facility]  Organism:No results found for: ORG    No results found for: LABGRAM  MRSA culture only:No results found for: [facility]  Urine culture: No results found for: LABURIN  Respiratory culture: No results found for: CULTRESP  Aerobic and Anaerobic :  No results found for: LABAERO  No results found for: LABANAE    Urinalysis:    No results found for: NITRU, WBCUA, BACTERIA, RBCUA, BLOODU, SPECGRAV, GLUCOSEU    Radiology:(All radiographs, tracings, PFTs, and imaging are personally viewed and interpreted unless otherwise noted). XR CHEST PORTABLE   Final Result   Impression:      Stable right basilar atelectasis. This document has been electronically signed by: Hakeem Camp MD on    03/11/2023 10:03 PM      XR CHEST PORTABLE   Final Result   1. Mild cardiomegaly with pulmonary vascular congestion suspicious for congestive heart failure. 2. Prominent pulmonary interstitium suspicious for pulmonary edema. **This report has been created using voice recognition software. It may contain minor errors which are inherent in voice recognition technology. **      Final report electronically signed by Dr. Devante Krueger on 3/11/2023 6:45 PM      XR CHEST PORTABLE   Final Result   1. Mild cardiomegaly with pulmonary vascular congestion suspicious for congestive heart failure. 2. Prominent pulmonary interstitium suspicious for pulmonary edema. **This report has been created using voice recognition software. It may contain minor errors which are inherent in voice recognition technology. **      Final report electronically signed by Dr. Devante Krueegr on 3/11/2023 1:23 PM      XR CHEST PORTABLE    (Results Pending)     XR CHEST PORTABLE    Result Date: 3/11/2023  1 view chest x-ray at 9:39 p.m. Comparison: 03/11/2023 6:23 p.m. Findings: Support lines and hardware are unchanged. Stable right basilar atelectasis and diaphragm elevation. Left lung clear. Heart size normal. Hiatal hernia.  No acute bony abnormalities. Impression: Stable right basilar atelectasis. This document has been electronically signed by: Patricia Maldonado MD on 03/11/2023 10:03 PM    XR CHEST PORTABLE    Result Date: 3/11/2023  PROCEDURE: XR CHEST PORTABLE CLINICAL INFORMATION: Central venous catheter placement TECHNIQUE: Mobile AP chest radiograph. COMPARISON: Mobile AP chest radiograph 3/11/2023 at 12:40 PM FINDINGS: The tip of a new right-sided internal jugular central venous line overlies the cavoatrial junction. There is new catheter tubing overlying the cardiac silhouette. There is mild stable enlargement of the cardiac silhouette. Pulmonary vessels are congested. Pulmonary interstitium is prominent. Degenerative changes in the thoracic spine are poorly visualized. The right hemidiaphragm is elevated. There are surgical clips in the right upper abdomen. 1. Mild cardiomegaly with pulmonary vascular congestion suspicious for congestive heart failure. 2. Prominent pulmonary interstitium suspicious for pulmonary edema. **This report has been created using voice recognition software. It may contain minor errors which are inherent in voice recognition technology. ** Final report electronically signed by Dr. Daniel Abrams on 3/11/2023 6:45 PM    XR CHEST PORTABLE    Result Date: 3/11/2023  PROCEDURE: XR CHEST PORTABLE CLINICAL INFORMATION: Chest pain TECHNIQUE: Mobile AP chest radiograph. COMPARISON: None FINDINGS: There is mild enlargement of the cardiac silhouette. Pulmonary vessels are congested. Pulmonary interstitium is prominent. Degenerative changes in the thoracic spine are poorly visualized. The right hemidiaphragm is elevated. There are surgical  clips in the right upper abdomen. 1. Mild cardiomegaly with pulmonary vascular congestion suspicious for congestive heart failure. 2. Prominent pulmonary interstitium suspicious for pulmonary edema. **This report has been created using voice recognition software.  It may contain minor errors which are inherent in voice recognition technology. ** Final report electronically signed by Dr. Anai Miranda on 3/11/2023 1:23 PM      EKG:  Findings consistent with evolving septal infarct, otherwise sinus tachycardia    CONSULTS:-  [] Cardiology  [] Nephrology  [] Hemo onco   [] GI   [] ID  [] Endocrine  [] Pulmo      [] Neuro    [] Psych   [] Urology  [] ENT   [] Delma Dad   []Ortho    []CV surg        [] Palliative  [] Hospice [] Pain management   []    []TCU   [] PT/OT  OTHERS:-    CODE STATUS:-  [x] Full resuscitation [] DNR-Comfort Care-Arrest  [] DNR-Comfort Care   [] Limited Resuscitation   [] No ET intubation   [] No CPR   [] No shock for non-perfusing rhythm    Total critical care time caring for this patient with life threatening, unstable organ failure, including direct patient contact, management of life support systems, review of data including imaging and labs, discussions with other team members and physicians at least  61 Min so far today, excluding procedures. Electronically signed by Neda Khanna MD on 3/12/2023 at 12:32 AM  CRITICAL CARE SPECIALIST.

## 2023-03-12 NOTE — PROGRESS NOTES
3/11   2031  Impella with suction alarm  Call placed to Dr Hansel Poe   discussed alarm. Need to decrease to p8. Also discussed potassium being elevated per Dr Hansel Poe ok to recheck electrolytes in the morning. 2042 suction alarm again per melhem give fluid bolus and decrease flow to p7  2056 called melhem again still with suction alarms, low urine output, no pedal pulses  Per Dr Hansel Poe recheck h/h   And give one gram mag and call in stat echo  2140 more suction alarms patient with increased pressor needs. Per Dr Hansel Poe call in cath lab  2110 Echo being completed bedside   2220 dr Hansel Poe at bedside patient with complaints of shortness of breath and extreme pain and numbness in the right lower extremity patient extremely anxious  2230 RSI completed   2235 patient profoundly hypotensive Dr Corby Hammond called to bedside  Refer to code documentation   [id number] 1 unit of prbcs was given per Dr Hansel Poe  Multiple titrations of levophed was done while in cath lab per Dr Kelley Barksdale 2300 and 3/12 0045  0130 cardiac output collected with swan, co 3.8 and ci 1.81   message sent to Dr Hansel Poe  Also he was notifed of absence of lower extremity pulses and no urine output    orderes placed to start primacor  0300 family at bedside.     65 family request that code status be changed to dnr    0530 family requests to have patient made comfortable and discussed this wished with resident Dr Lisbeth Nyhan   4428 patient pronunouced by Dr Lisbeth Nyhan

## 2023-03-12 NOTE — BRIEF OP NOTE
[facility]  Sedation/Analgesia Post Sedation Record    Pt Name: Lucrezia Goldberg  Account number: [de-identified]  MRN: [id number]  YOB: 1945  Procedure Performed By: Deepika Valentine MD MD   Primary Care Physician: None None  Date: 3/12/2023    POST-PROCEDURE    Physicians/Assistants: Deepika Valentine MD MD     Procedure Performed: Pericardiocentesis, RHC, LHC, Impella repositioning under echocardiographic and fluoroscopic guidance         Sedation/Anesthesia: Versed/ Fentanyl and 2% xylocaine local anesthesia. Estimated Blood Loss: < 50 ml. Specimens Removed: None         Disposition of Specimen: N/A        Complications: No Immediate Complications. Post-procedure Diagnosis/Findings:       Cardiogenic shock, cardiac tamponade, Pericardiocentesis, RHC, LHC, [place]-Ryne catheter placement, Impella repositioning under echocardiographic and fluoroscopic guidance    Details dictated in procedure note  Plan of care discussed with ICU team and RN    Above findings and plan of care were discussed with patient's family, questions were answered, agreeable with plan.        Deepika Valentine MD, Adeel Frye   Electronically signed 3/12/2023 at 12:39 AM  Interventional Cardiology

## 2023-03-12 NOTE — SIGNIFICANT EVENT
Case discussed with patient's family at bedside. Reviewed the patient's hospital course and overnight events. CODE Status was discussed at bedside. Patient's daughters Radha Valle and Brandi Dan state clearly that patient would not want to remain in this condition; rest of family present is in agreement. All CODE statuses were discussed with patient's family at bedside; all parties present verbalized understanding and are agreeable to CODE STATUS change to [facility]. They express that they want the patient's medication regimen optimized for comfort. They express that they want the mechanical ventilation discontinued. They clearly express that they would no longer want to pursue aggressive therapies. Code Status changed to [facility]. Medication regimen optimized per patient's family's wishes. Primary RN at bedside during conversation. The opportunity to ask additional questions was offered. No additional questions were voiced. All parties verbalized understanding and are agreeable to the plan of care. Emotional and spiritual support provided at bedside.     Case discussed with senior resident Nena Petersen at [phone] regarding updated plan of care per patient's family's wishes.    -Guero Bradshaw MD  Resident

## 2023-03-12 NOTE — DISCHARGE SUMMARY
DEATH SUMMARY      Patient:  Ilene Patel  YOB: 1945  MRN: [id number]   Acct: [de-identified]    Primary Care Physician: None None  Admitting Physician: Star Waite MD  Discharging Physician: Bon Tay MD IM Resident   Admit date:  3/11/2023    Discharge date:  03/12/2023    Admission Condition: Poor    Discharge Diagnoses:   Cardiogenic Shock  Acute Hypoxic Respiratory Failure  STEMI  Severe Multivessel CAD, s/p Complex PCI with stenting with Impella Support  S/p PEA Arrest  Large hemorrhagic pericardial effusion with cardiac tamponade, s/p pericardiocentesis      Consultations:-  CT Surgery, Critical Care  OTHERS:-    Significant Diagnostic Studies:    XR CHEST PORTABLE    Result Date: 3/12/2023   ADDENDUM #1  Addendum: There is an NG tube to the left of midline in the chest. It is coiled over the left lung base. It may be within a hiatal hernia. Exact location of tip can not be confirmed. This document has been electronically signed by: Milli Sanchez MD on 03/12/2023 04:33 AM  ORIGINAL REPORT  1 view chest x-ray. Comparison: 03/11/2023 Findings: Endotracheal tube tip 1 cm above marielena. It may need to be withdrawn slightly. New tubing noted over the left lung base. Nature and position are unknown. Rest of the hardware is unchanged. Lungs are clear without acute infiltrates. No pneumothorax. Heart size enlarged. Probable hiatal hernia behind the heart. No acute bony abnormalities. Surgical clips left axilla and/or chest wall. Impression: Support lines listed above No acute processes. This document has been electronically signed by: Milli Sanchez MD on 03/12/2023 03:22 AM     XR CHEST PORTABLE    Result Date: 3/11/2023  1 view chest x-ray at 9:39 p.m. Comparison: 03/11/2023 6:23 p.m. Findings: Support lines and hardware are unchanged. Stable right basilar atelectasis and diaphragm elevation. Left lung clear. Heart size normal. Hiatal hernia. No acute bony abnormalities. Impression: Stable right basilar atelectasis. This document has been electronically signed by: Nany Giordano MD on 03/11/2023 10:03 PM    XR CHEST PORTABLE    Result Date: 3/11/2023  PROCEDURE: XR CHEST PORTABLE CLINICAL INFORMATION: Central venous catheter placement TECHNIQUE: Mobile AP chest radiograph. COMPARISON: Mobile AP chest radiograph 3/11/2023 at 12:40 PM FINDINGS: The tip of a new right-sided internal jugular central venous line overlies the cavoatrial junction. There is new catheter tubing overlying the cardiac silhouette. There is mild stable enlargement of the cardiac silhouette. Pulmonary vessels are congested. Pulmonary interstitium is prominent. Degenerative changes in the thoracic spine are poorly visualized. The right hemidiaphragm is elevated. There are surgical clips in the right upper abdomen. 1. Mild cardiomegaly with pulmonary vascular congestion suspicious for congestive heart failure. 2. Prominent pulmonary interstitium suspicious for pulmonary edema. **This report has been created using voice recognition software. It may contain minor errors which are inherent in voice recognition technology. ** Final report electronically signed by Dr. Megha Robles on 3/11/2023 6:45 PM    XR CHEST PORTABLE    Result Date: 3/11/2023  PROCEDURE: XR CHEST PORTABLE CLINICAL INFORMATION: Chest pain TECHNIQUE: Mobile AP chest radiograph. COMPARISON: None FINDINGS: There is mild enlargement of the cardiac silhouette. Pulmonary vessels are congested. Pulmonary interstitium is prominent. Degenerative changes in the thoracic spine are poorly visualized. The right hemidiaphragm is elevated. There are surgical  clips in the right upper abdomen. 1. Mild cardiomegaly with pulmonary vascular congestion suspicious for congestive heart failure. 2. Prominent pulmonary interstitium suspicious for pulmonary edema. **This report has been created using voice recognition software.  It may contain minor errors which are inherent in voice recognition technology. ** Final report electronically signed by Dr. Kiki Cardenas on 3/11/2023 1:23 PM       [address]:-  This is a 65year old female with a PMHx of HTN, HLD, COPD, and GERD who presented to [facility] ED on 2023 with new onset chest pain. EKG showed ST changes in V3, V4, and V5 with troponin elevation for which STEMI alert was called. The patient was notably hypotensive at admission for which CVC was placed and levophed was started. The patient was intubated at [address] by ICU provider. The patient was urgently taken to cath lab and found to have severe stensosis in the RCA, left main and Lcx and midd LDA with an EF of 10%. CT surgery was consulted. The patient was deemed not a candidate for surgical intervention and the decision was made to proceed with high risk stenting per Dr. Berenice Le. Impella was placed and PCI with stenting was performed of LAD, Left Main, and Lcx. Documentation indicates that the patient's heart rate continued to drop post-procedure and a pulse was noted palpated. CPR was initiated and ROSC was achieved after 2 rounds of CPR with 2 doses of epinephrine. The patient was taken back to the Cath lab for which RHC, LHC, [place]-Ryne catheter placement and Impella repositioning under fluoroscopy and echocardiogram was performed. The patient was transferred to the ICU post-procedure. [address] discussion with the patient's family was held by Dr. Cb Mccullough and patient's CODE status was changed to [facility]. Additional [address] discussion was held and patient's code status was changed to [facility]; patient's family clearly state prior to CODE Status change that they no longer want to pursue aggressive therapies and that the patient would not want to be in her current condition. Comfort care measures were initiated and the patient was terminally extubated per the patient's family's wishes at bedside.  Patient was declared  at [facility] 38. by Dr. Jana Crowell Ivania. The patient was discharged on 2023.      Cause of Death:   Acute Hypoxic Respiratory Failure secondary to Cardiogenic Shock secondary to Severe Multivessel Coronary Artery Disease induced Mycoardial Infarction    Time of Death:   on 2023    Disposition:      Time Spent:  25 minutes    Electronically signed by Nancy Brown MD IM Resident on 3/12/23 at 7:31 AM EDT

## 2023-03-12 NOTE — PROCEDURES
[address] [place] [address]                            CARDIAC CATHETERIZATION    PATIENT NAME: Autumn Newton                      :        1945  MED REC NO:   [id number]                           ROOM:       0012  ACCOUNT NO:   [de-identified]                           ADMIT DATE: 2023  PROVIDER:     Arslan Landin MD    DATE OF PROCEDURE:  2023    INDICATION:  This is a 67-year-old female patient who presented to [address] [place] [address] with ST-elevation myocardial infarction on  2023. The patient was found to have severe multivessel coronary  artery disease. She also developed cardiogenic shock with severe  systolic dysfunction, systolic heart failure, severe cardiomyopathy with  ejection fraction around 10%. She is status post Impella-guided  multivessel PCI. After heart team discussion and cardiovascular surgery  evaluation deeming the patient to have prohibitive risk for coronary  artery bypass graft surgery, and recommending salvage PCI. The patient  was in the intensive care unit when I was called by ICU team to be  informed that the patient developed progressive hypotension with  worsening shock. She has been on Levophed with increased dose  requirement. The patient also was noted to have suction alarms on the  Impella. A stat echocardiogram was ordered revealing evidence of a new  large pericardial effusion. There was concern for impending cardiac  tamponade. The cardiac catheterization team was called in. While in  the intensive care unit, the patient developed a brief PEA arrest after  she was intubated electively prior to cardiac catheterization procedure  as she was noted to be confused and was moving with concern for changing  in position of the Impella device, triggered by patient movement. The  case was discussed with the patient's family.   There was concern for  impending cardiac tamponade as detailed above.  Etiology for large  pericardial effusion included possible complication related to recent  PCI, coronary perforation or spontaneous bleeding in setting of  high-risk PCI and anticoagulation requirement.  After the case was  discussed with the patient's family including risks, benefits,  indications, as well as alternatives of procedure, they agreed to  proceed.    PROCEDURES PERFORMED:  1.  Echocardiographic-guided pericardiocentesis.  2.  Left cardiac catheterization with selective coronary angiogram.  3.  Right heart catheterization with placement of [place]-Ryne catheter.  4.  Impella repositioning under echocardiographic and fluoroscopic  guidance.    DESCRIPTION OF PROCEDURE/DETAILS:  After informed consent, the patient  was brought to the cardiac catheterization room.  She was prepped and  draped in a sterile fashion.  2% lidocaine was injected in the subcostal  area.  Under echocardiographic guidance and fluoroscopic guidance, I  advanced the pericardiocentesis needle and was able to access the  pericardial space.  Pericardial pressure was measured.  I also performed  a bubble study to confirm intrapericardial positioning.  The bubble  study confirmed intrapericardial positioning and there was no transition  into the LV or RV of the injected, agitated saline bubbles.  The wire  was advanced, dilation was done.  I then placed a pigtail catheter.   Then, 500 mL of hemorrhagic pericardial fluid was drained.  As the  pericardial fluid was drained, there was significant improvement of the  patient's hemodynamics and blood pressure.  I also performed  echocardiographic-guided repositioning of the Impella.  Position was  confirmed under echocardiographic guidance with aortic valve to _____  distance measured at around 3.1 cm.  After the fluid was drained, we  were able to go down on Levophed drip dose from 60 mcg per minute down  to 40 mcg.  Due to hemorrhagic pericardial effusion, there  was concern  for possible coronary perforation. I proceeded with coronary angiogram.  I did access the Impella diaphragm using micropuncture kit. A 6-[place]izen of [place]  sheath was inserted, then flushed with normal saline. I used 5-[place]izen of [place]  3DRC and 6-[place]izen [place] EBU 3.5 guide catheter to perform the coronary  angiogram.    FINDINGS ON CORONARY ANGIOGRAM:  1. The right coronary artery has proximal 100% chronic total occlusion  with filling via bridging collaterals as well as left-to-right  collaterals. Stable findings. 2.  Left main coronary artery, patent. No significant stenosis. Patent  intervention site, gives rise to left circumflex and left anterior  descending arteries. 3.  Left circumflex artery. The stent at the ostium to proximal part of  the vessel was noted. The ostial part of the stent is widely patent. GERRI-3 flow was noticed in the left circumflex and left main arteries. The area of stent under expansion was noted, stable from before. The  stent has been placed just proximal to the aneurysmal segment, which  seemed to have improved. There was dye staining on multiple views in  this area. It was not clear whether this is just the dye floating  within the aneurysmal segment versus possible contained perforation. The obtuse marginal branches have severe disease, unchanged from before. 4.  Left anterior descending artery. Stent in the proximal to mid LAD  is patent. Distal LAD with mild diffuse disease. First diagonal branch  remains patent. GERRI-3 flow was noted in the left anterior descending  artery. As the patient had evidence for hemorrhagic pericardial effusion, I did  order 1 unit of packed red blood cells stat, which was transfused during  the procedure. The coronary angiogram was reviewed in details and was  compared to the study done at the intervention time in setting of STEMI  on 03/11/2023.   A small contained, probably wire perforation in the  proximal segment/aneurysmal segment of left circumflex artery could not  be fully excluded. I did plan the drain after repeat echocardiogram  after initial drainage revealed resolution of the pericardial effusion. I continued to plan the pericardial drain in an attempt to assess for  possible ongoing oozing and extravasation from any possible perforation. The patient remained stable with good waveform noted in the Impella and  improved hemodynamics as detailed above. At this time, we turned our attention to the right heart catheterization  procedure. The right internal jugular vein central line was thoroughly  sterilized with multiple chlorhexidine preps. J wire was introduced. Progressive dilation was done. I then placed a 9-[place] sheath. Sheath  was flushed with saline. The [place]-Ryne catheter was advanced under  fluoroscopy and right heart catheterization was completed. Findings of right heart catheterization:  The cardiac output was 6.5  liters per minute. Cardiac index was 3.2. Aortic oxygen saturation  99%. Final arterial pressure 71%, stent were repeated again for  confirmation and confirmed. PA oxygen saturation was 71%. PA oxygen  saturation was repeated and again confirmed to be at 71%. Right atrial  oxygen saturation 76%. The right atrial pressure was 32. Right  ventricular pressure 56/18. PA pressure 52/34. Mean pulmonary arterial  pressure 41. The right IJ line was secured in place. [place]-Ryne  catheter was kept in place. Through the right common femoral artery and through the 6-[place] sheath,  I advanced a 5-[place] VCF catheter. Abdominal aortogram was performed  revealing patent distal abdominal aorta and bilateral iliac vessels. Some flow limitation on the right side of the Impella site was noted. At this time, I unclamped the pericardial drain. The drain was flushed  with heparinized saline. Attempt to drain more fluid from the  pericardial space did not reveal significant drainage. The  echocardiogram was repeated. Repeat of the echocardiogram at this time  allowed us 1 hour wake time since the initial pericardiocentesis. The  repeat echocardiogram revealed no evidence for any significant  pericardial effusion. At this time, given that there is no recollection  of pericardial effusion and no clear ongoing extravasation noticed on  coronary angiogram with possibility of a contained small perforation not  fully ruled out. I elected not to pursue any intervention. I prolonged  ballooning at the site of suspected possible contained perforation,  would possibly induce further ischemia in this patient with cardiogenic  shock, severe cardiomyopathy status post ST-elevation myocardial  infarction and coronary artery disease. I did not want to give heparin  to avoid inducing recurrence of bleeding and pericardial effusion. The  pericardial drain will be kept in place with close monitoring of the  output. The pericardial drain was secured in place. MEDICATIONS:  See MAR. ESTIMATED BLOOD LOSS:  Less than 50 mL. COMPLICATIONS:  Suspected possible left circumflex artery wire  perforation with details as listed above, was possible. ACCESS:  Right IJ access for right heart catheterization, subcostal  approach for pericardiocentesis. Right common femoral artery access for  left cardiac catheterization. IMPRESSION:  1. Cardiogenic shock. 2.  Status post ST-elevation myocardial infarction with multivessel  Impella-assisted PCI. Refer to previous cardiac catheterization note  for details. 3.  Status post PEA cardiac arrest.  4.  Large hemorrhagic pericardial effusion with cardiac tamponade,  status post pericardiocentesis. Differential diagnoses for etiology include,  1. Possible concurrent wire perforation in the left circumflex artery  stenting area versus spontaneous bleeding in setting of PCI requiring  anticoagulation.   2.  Right heart catheterization with [place]-Ryne catheter placement. 3.  Repositioning of the Impella under fluoroscopy and echocardiogram  guidance. 4.  Status post left cardiac catheterization. Patent intervention  sites. No acute thrombotic events noted. PLAN OF CARE:  The patient will be transferred back to the intensive  care unit. As mentioned above, the repeat echocardiogram in 1 hour post  pericardiocentesis revealed no reaccumulation of the pericardial  effusion. The pericardial drain will be kept in place. The patient has  received 1 unit of packed red blood cells. Repeat CBC in the morning. Hemodynamics have improved after the pericardiocentesis. The Impella  was kept in place, currently at _____ for support. The positioning was  confirmed under echocardiogram guidance. Levophed drip will be  continued. Target mean arterial pressure above 65. Ventilator  management per the ICU team.  Monitor hemodynamics. The Impella heparin  purge was switched to D5W purge to limit anticoagulation. Continue  dual-antiplatelet therapy with aspirin and Brilinta. High intensity  statin therapy. The findings and plan of care were discussed in details  with the patient's family. They are agreeable to the plan. Their  questions were answered.         Thaddeus Shaffer MD    D: 03/12/2023 1:09:04       T: 03/12/2023 1:20:27     [email or web address]  Job#: [id number]     Doc#: [id number]    CC:

## 2023-03-12 NOTE — DEATH NOTES
[address] [address]  Notice of Patient Passing      Patient Name- Dot Medrano Number- [de-identified]   Attending Physician- Luli Rolon MD    Admitted on-3/11/2023 12:06 PM     On 3/12/2023 at [phone] patient was found in 4D 12 with:   Absence of vital signs. Absence of neurological response. Confirmed time of death at [phone]. Physician or On-call Physician notified of time of death- yes    Family present at time of death- yes, multiple   Spiritual care present at time of death- yes, [facility]    Physician was notified and orders were obtained to release the body. Post-Mortem documentation completed; form printed, signed, and given to admitting. David Turner RN RN Nursing Supervisor/ Manager  3/12/23   7:53 AM    ________________________________________________________________________    Complete information below if 's Case only:    Death Notification    Reported ________________   [facility] Office  s Case __________   Internal Use Only   Autopsy       Y ____    N_x___  s & Investigators Notes  Agency    ________________  Ray Badillo:  [phone]   Agency #   _______________     Call Date: 3/21/23   Call Time: 0900  Notified by: Tello Hoffman Supervisor  Of: Death 4D 12    Type of Death:   [x] Notification  []Hospice  [x]Hospital < 24 Hour  [] ED Death  []Home  []Nursing Home      Pt Name: Deepali Covert   Address: [address] [id number]  Phone: [phone] (home)    MRN: [id number]    AGE: 65 y.o.    YOB: 1945       GENDER: female     Primary Care Physician: None None   Attending Physician Name: Luli Rolon    Date of Death: 03/12/23  Time of Death: 0650  Pronounced By: Dr Amadeo Fernandez      Emergency Contact:   Name of Family Notified of Death: [address] Person Phone Number: [phone]  Address of Next of Kin: [place] Jonas    Cause of Death: Respiratory Arrest    Co-Morbidities:  Patient Active Problem List   Diagnosis    STEMI involving left main coronary artery [facility])    Acute coronary syndrome [facility])    Cardiogenic shock [facility])           Name of  Home[place] [address]  Phone Number: [phone]    Who Will Sign Death Certificate:     [x]  Dr. Gustabo Potter    [] : Dr. Rufino Resendez

## 2023-03-12 NOTE — PROGRESS NOTES
Last night, patient underwent pericardiocentesis, LHC, Impella repositioning and RHC with [place]-Ryne catheter placement. Coronary angiogram revealed that intervention sites were patent, no acute thrombotic events (no stent thrombosis). Hemodynamics improved post pericardiocentesis and Impella repositioning, cardiac index 3.2 by Derek in the cath lab, details in procedure note. She was sent back to ICU, remained intubated with continued MCS (Impella in place), on Levophed gtt. There was a drop in CO/CI. Hemodynamics were received at ~1:33 am (CVP 10, mPAP 32, CI 1.81 by thermodilution). Milrinone gtt at 0.25 mcg/kg/min was added with plan to increase P-level on Impella to 5, then to 6 if tolerated without suction alarms and repeat hemodynamics in AM. Cold feet in setting of cardiogenic shock, vasoconstriction and elevated SVR was reported, local nitropaste application was ordered. Labs revealed increased Cr to 1.6. Abnormal LFTs are c/w shocked liver. Events noted, chart was reviewed. Also, I spoke this AM to patient's RN from night shift Rony Basilio RN and to ICU resident Ashley Martell MD. Withdrawal of care was decided per patient's family wishes. The patient was made DNR and was then terminally extubated per ICU team after their discussion with her family. She was pronounced dead at 6:50 am. I spoke to patient's family this am (granddaughter, Kyra Rothman).      Amanda Rodrigues MD, [facility], [facility]  Interventional Cardiology  [facility]

## 2023-03-12 NOTE — PROCEDURES
[address] [id number]                            CARDIAC CATHETERIZATION    PATIENT NAME: Norma Munson                      :        1945  MED REC NO:   [id number]                           ROOM:       0012  ACCOUNT NO:   [de-identified]                           ADMIT DATE: 2023  PROVIDER:     Diane Valdivia MD    DATE OF PROCEDURE:  2023    INDICATIONS:  ST-elevation myocardial infarction and cardiogenic shock. May refer to detailed history and physical note for more details. PROCEDURES PERFORMED:  1. Left cardiac catheterization with selective coronary angiogram.  2.  Left ventriculogram.  3.  Successful placement of percutaneous left ventricular assist device,  Impella. 4.  Successful PCI and stenting of the left anterior descending artery. 5.  Successful PCI and stenting of the left main coronary artery. 6.  Successful PCI and stenting of the left circumflex artery. SURGEON:  Diane Valdivia MD    DESCRIPTION OF PROCEDURE/INTERVENTION DETAILS:  In summary, this is a  59-year-old female patient who presented to the emergency room with  acute-onset chest pain earlier today. The patient was found to have ST  elevation on EKG done in the emergency room. I received a call from Dr. Elmer Naranjo around 12:33 today. Case was discussed with Dr. Elmer Naranjo. I did  review the EKG and decision was made with immediate STEMI alert  activation. The patient was brought to the cardiac catheterization room  after informed consent was obtained. She was prepped and draped in a  sterile fashion. 2% lidocaine was injected in the skin and subcutaneous  tissue overlying the right groin area. Under ultrasound guidance using  modified Seldinger technique and utilizing micropuncture kit, access was  obtained in the right common femoral artery.   A 6-Armenian 3DRC, 6-Armenian  JL-4 diagnostic catheters were used to complete the coronary angiogram,  6-Congolese pigtail catheter was used to complete the left ventriculogram.   The patient has been on Levophed drip and a central line was placed in  the emergency room due to cardiogenic shock. She required increased  doses of Levophed up to 10 mcg per minute. The patient also continued  to have chest pain. Her LVEDP was 29 mmHg with picture consistent with  cardiogenic shock and the left ventriculogram revealed evidence of  severe global hypokinesis with ejection fraction estimated around 10%. There was no evidence for left ventricular thrombus. On the common  femoral artery angiogram, common femoral artery puncture site was  confirmed to be in good position. At baseline, the patient did have  peripheral arterial disease with relatively weak pulses in both feet,  probably exacerbated by cardiogenic shock and vasoconstriction. Her  feet were cold. On the coronary angiogram, unfortunately, the patient  was found to have severe multivessel coronary artery disease. The right  coronary artery was a dominant vessel with evidence of what seemed to be  chronic total occlusion. The vessel filled faintly via bridging  collaterals and did receive left-to-right collateral.  Initially, the  possibility of an acute thrombotic event complicating severely calcified  proximal RCA lesion was not excluded. The left system revealed severe  multivessel disease with the culprit for the STEMI being the  ostial/proximal RCA with extension into the distal left main which was  found to have what appeared to be thrombotic 80% to 90% stenosis with  subtotal occlusion of ostial/proximal LAD as well as involvement of  large diagonal branch. The left circumflex artery had severe stenosis  in the ostial/proximal segment. There was evidence of post stenotic  aneurysmal dilation in the proximal to mid part of the circumflex artery  with lesions involving the distal LCX and an ostial OM of the aneurysmal  segment site.   Due to complex anatomy, decision was made to have heart  team approach. The findings and clinical presentation were discussed  with Dr. Saadia Gu from Cardiovascular Surgery who kindly presented to  the cardiac catheterization room and reviewed the patient's clinical  presentation and left cardiac catheterization findings. The patient was  felt to have prohibitive risk for coronary artery bypass graft surgery  per Cardiovascular Surgery. Recommendation was to proceed with salvage  PCI. Due to cardiogenic shock with elevated left-sided filling pressure  and the complex anatomy involving multivessel as detailed above, I  decided to proceed with mechanical circulatory support. After  confirmation of good puncture site or adequate puncture site in the  right common femoral artery, I did proceed with pre-close approach where  two Perclose sutures were applied but not fully deployed in an  orthogonal fashion. Progressive dilation was completed using dilators  provided with the Impella. I then exchanged the wire to the stiff  Impella 0.035 wire. Over this wire, I advanced the 14-Ku[place]i Impella  sheath. The dilator was removed. The sheath was flushed with normal  saline. The Impella CP was prepared outside the patient's body per  's recommendation. I then advanced a 6-Ku[place]i pigtail  catheter over the J-wire which was used to cross the aortic valve into  the left ventricle. The wire was then exchanged to the 0.018 Impella  wire. The catheter was removed. I then advanced the Impella under  fluoroscopy guidance. Positioning was confirmed under fluoroscopy. The  0.018 wire was removed. Heparin had been given and ACT was confirmed to  be above 250. The Impella was started at P12. Extensive waveforms were  noted. Afterwards, I proceeded with single access technique where the  diaphragm of the Impella sheath was accessed using micropuncture kit.   A  6-Ku[place]i sheath was placed and flushed with normal saline. This was  utilized to complete the PCI procedure. I then proceeded with wiring. As mentioned above, the proximal LAD had subtotal occlusion with  involvement of a relatively large first diagonal vessel. Runthrough  wire was used to cross the LAD lesion and wire in the left anterior  descending artery. A Fielder wire was used to wire into the first  diagonal branch. I did attempt to advance the stent into the diagonal  branch. This was not successful. Further views were obtained. There  seemed to be thrombotic involvement of the ostial first diagonal branch. I elected to proceed with balloon angioplasty with kissing balloon  inflation as well. A 2.0 x 12-mm PTCA balloon was advanced to the  ostial D1, a 3.0 x 15-mm PTCA balloon was advanced to the LAD. Kissing  balloon angioplasty was performed. I then did further predilation in  the proximal to mid LAD using the 3.0 x 15-mm PTCA balloon. The wire  was removed from the side branch. There was difficulty advancing the  stent to the left anterior descending artery and support from a 6-Bolivian  Josphine Comp was required. This allowed me to deliver a 3.0 x 38-mm Ashkum  Burke drug-eluting stent to the proximal to mid segment of the left  anterior descending artery. The repeat angiogram did show significant  improvement in the flow of the first diagonal branch without significant  residual stenosis after the angioplasty. I decided not to stent the  diagonal branch. The Kameron Burke 3.0 x 38-mm drug-eluting stent was  postdilated using same stent balloon. There was area of under expansion  of the curve which improved after postdilation using the stent balloon. The same stent balloon was used for further postdilation in the proximal  LAD. I then used a 3.0 x 15-mm noncompliant balloon for further  postdilation of the LAD stent. This was also utilized for predilation  in the proximal LAD segment.   I then placed the [facility] wire in the  circumflex artery prior to stenting of left main into LAD for side  branch protection. Afterwards, I proceeded with stenting of left main  into left anterior descending artery where an Theodosia DuPage 3.5 x 26-mm  drug-eluting stent was successfully deployed with overlap with  previously deployed stent in the proximal to mid segment of the left  anterior descending artery. The side wire was removed. As mentioned  above, there was ostial circumflex disease at baseline, however, flow  was maintained. I did further postdilation in the left main into LAD  stent using 3.5 x 12-mm noncompliant balloon inflated at high pressure  in the left main coronary artery. Wires were exchanged. I did rewire  the circumflex artery through the left main stent struts. I then turned  my focus on the left circumflex artery. As mentioned above, left  circumflex artery had a complex anatomy with disease involving the  ostium of the vessel, proximal part had severe stenosis followed by post  stenotic aneurysmal segment. I did remove the wire from the left  anterior descending artery to allow utilization of the 6-Luxembourgish  Hedwig Shown which was re-introduced and was advanced to the left  circumflex artery. This allowed me to wire into the mid segment of the  circumflex artery up into the obtuse marginal vessel. I wanted to avoid  stenting as much as possible in the aneurysmal segment. However,  ostial/proximal left circumflex artery stenting was warranted in an  effort to improve the flow into the vessel. There seemed to be a  chronic total occlusion of the distal part as will be detailed down in  the angiogram findings. The goal was to improve the flow of the  ostial/proximal part of the vessel. I did predilate using 2.0 x 12-mm  PTCA balloon in the ostial proximal part of the vessel. The stent  struts were opened. This allowed delivery of the stent over the wire  with support from the [address] [id number].   An Theodosia Paulding  drug-eluting stent size 3.5 x 22-mm was successfully deployed covering  the ostium of the left circumflex artery into the proximal segment with  slight overlap within the aneurysmal segment. I did ensure coverage of  the ostium with two to three stent struts protruding the left main  coronary artery. The stent was successfully deployed. Good expansion  was noted in the ostial part of the vessel. However, in the proximal  part just before the aneurysmal segment, there was some area of under  expansion. Residual stenosis in that segment was 30% to 40%. I then  rewired back into the left anterior descending artery using the [facility]  wire. I then proceeded with kissing balloon inflations first using a  2.0-mm balloon in the left main to LAD. This was then exchanged to a  3.0 x 12-mm noncompliant balloon in the left main to LAD with a 2.5 x  12-mm noncompliant balloon in the left main into the left circumflex  artery. Kissing balloon inflations were completed. The wires were  removed. The repeat angiogram revealed zero residual stenosis of the  left main coronary artery and target lesion areas in the proximal  segment and mid segment of the left anterior descending artery. GERRI-3  was re-instituted in the left anterior descending artery as well as the  first diagonal branch. Residual stenosis in the obtuse marginal branch  and the distal left circumflex artery stable from baseline. The left to  right collateral flow filling into the distal RCA had been maintained. Prior to removal of the sheath, I did use a 6-Gibraltarian JR-4 guide catheter  to cannulate the right coronary artery. As mentioned above, the  proximal RCA was suspected to have chronic total occlusion. I did  attempt to pass the wire and the wiring attempt did confirm chronic  total occlusion. At this time, given the vessel was filling via  collateral flow, no further attempt for PCI was done in the RCA.   The  sheath was removed from the Impella diaphragm. I did perform an  angiogram using the side port of the Impella sheath. Peripheral  angiogram of the right lower extremity was completed, although flow was  slightly sluggish due to cardiogenic shock and peripheral arterial  disease with vasoconstriction. The flow had been maintained. I was  able to see the vessels slightly below the knee with the straight line  flow. There seemed to be diffuse disease in the below-knee vessels. The Impella was secured in place. FINDINGS:  LEFT VENTRICULOGRAM:  Severe global hypokinesis. Ejection fraction  estimated at 10%. 1+ mitral regurgitation. No thrombus formation  noted. HEMODYNAMICS:  Left ventricular end-diastolic pressure 29 mmHg. No  significant pressure gradient across the aortic valve upon pullback. CORONARY ANGIOGRAM:  1. Right coronary artery:  Proximal segment of RCA has 100% chronic  total occlusion with severe calcification. The vessel fills via  bridging collaterals. RCA is dominant vessel. The distal segments  including PLB and PDA do receive left-to-right collateral flow as well. 2.  Left main coronary artery:  The distal left main coronary artery has  severe apparently thrombotic 80% to 90% stenosis. It gives rise to left  circumflex and left anterior descending arteries. 3.  Left circumflex artery:  Ostial segment has 70% to 80% stenosis with  haziness, possibly calcific versus thrombotic. The proximal segment  afterwards has 90% stenosis followed by segment of post stenotic  aneurysmal dilation. At the aneurysmal segment, OM1 seems to be a large  vessel with an ostial 90% stenosis and OM2 has 70% to 80% ostial  stenosis, both seemed to be arising from the aneurysmal segment. The  distal LCX has diffuse disease and chronic total occlusion and fills  faintly through left collaterals.   4.  Left anterior descending artery:  Ostial/proximal segment has  subtotal occlusion with mild calcification and thrombotic formation. D1  has an ostial subtotal occlusion which appears to be involvement of  thrombotic episode from the STEMI. Mid segment has 70% to 80% stenosis. Distal LAD is patent with mild diffuse disease involving the apical  segment. MEDICATIONS:  See MAR. COMPLICATIONS:  None. ESTIMATED BLOOD LOSS:  Less than 50 mL. ACCESS:  Right common femoral artery access. IMPRESSION:  1. ST-elevation myocardial infarction. 2.  Cardiogenic shock. 3.  Multivessel coronary artery disease. 4.  Status post complex Impella facilitated PCI including PCI and  stenting of the left main coronary artery, left anterior descending  artery, left circumflex artery as well as PTCA of the first diagonal  vessel. 5.  Cardiogenic shock. 6.  Status post Impella placement. 7.  Peripheral arterial disease. RECOMMENDATIONS AND PLAN OF CARE:  The patient will be admitted to the  intensive care unit. Strict bed rest.  Monitor blood pressure closely. Monitor urine output. Goal mean arterial pressure is about 65 mmHg. Continue aspirin and Brilinta, high intensity statin therapy. Obtain a  full echocardiogram on Monday. Repeat EKG on the floor. Daily BMP. Check CBC, BMP, and lipid panel in the morning. Access site care. Levophed was stopped in an effort to limit peripheral vasoconstriction. Blood pressure remained stable with mean arterial pressure in the upper  80s. Monitor intake and output. Restrict sodium and fluid intake. Guideline-directed medical therapy for congestive heart failure will be  gradually initiated as shock resolves. LifeVest is indicated prior to  discharge. Findings and plan of care discussed with the patient and  family and they are agreeable to the plan.         Miguel Bashir MD    D: 03/11/2023 17:41:30       T: 03/11/2023 17:53:56     [id number]  Job#: [id number]     Doc#: [id number]    CC:

## 2023-03-12 NOTE — DEATH NOTES
Death Pronouncement Note  Patient's Name: Felecia Bhatti   Patient's YOB: 1945  MRN Number: [id number]    Admitting Provider: Jessica Bello MD  Attending Provider: Jessica Bello MD    Patient was examined and the following were absent: Pulses, Blood Pressure, and Respiratory effort    I declared the patient dead on 3/12/2023 at 6:50 AM    Preliminary Cause of Death: Acute hypoxemic respiratory failure [facility])     Electronically signed by Carrie Delacruz MD on 3/12/23 at 6:55 AM EDT

## 2023-03-12 NOTE — SIGNIFICANT EVENT
Was called to bedside to discuss patient with family. Patient was informed of the critical nature of the patient's illness as well as the code that was performed earlier tonight on her. They were informed of the pericardial tamponade and the need to put in a special line to help drain blood from around the outside of her heart. They were informed of the Impella device which is helping support the blood pressure to allow the heart to rest.  Family is very tearful at this time. Family had discussion among themselves and believe that if the patient were to arrest again that she would not want further CPR. Family also believes that she would not want to be shocked in the event she were to arrest.  They are okay leaving the ET tube in place and do not want to terminally extubate but if it is \"her time to go\" then \"it is her time to go\". CODE STATUS updated in the chart to reflect family's wishes. I was available for all questions.

## 2023-03-12 NOTE — SIGNIFICANT EVENT
2235 Pt intubated by Dr. Aarti Talley with Ryann Stephenson RT at bedside. ETLISSETT [email or web address], +color change, bilat breath sounds auscultated. Pt increased bradycardia w/ drop in BP. 1mg IVP Epi given per verbal order received from Dr. Aarti Talley. Cath lab RNs in room. 2236 No longer able to palpate pulses at this time, CODE BLUE activated, Impella decreased to P2.    2238 Pulse check, PEA, compressions resumed. 1mg Epi IVP given. 2240 Pulse check, ROSC, CODE BLUE discontinued. 1amp bicarb given. 2241 Pt placed on portable defib monitor, to cath lab with Dr. Amena Pedro, cath lab team, Joy Porter Primary RN and Kieran Talbot 2.

## 2023-03-13 LAB
BACTERIA SPEC AEROBE CULT: NORMAL
BACTERIA UR CULT: NORMAL

## 2023-03-14 LAB — HAPTOGLOB SERPL-MCNC: < 10 MG/DL (ref 30–200)

## 2023-04-01 LAB
EKG ATRIAL RATE: 67 BPM
EKG ATRIAL RATE: 83 BPM
EKG ATRIAL RATE: 95 BPM
EKG P AXIS: 62 DEGREES
EKG P AXIS: 76 DEGREES
EKG P AXIS: 79 DEGREES
EKG P-R INTERVAL: 160 MS
EKG P-R INTERVAL: 166 MS
EKG P-R INTERVAL: 172 MS
EKG Q-T INTERVAL: 386 MS
EKG Q-T INTERVAL: 424 MS
EKG Q-T INTERVAL: 430 MS
EKG QRS DURATION: 138 MS
EKG QRS DURATION: 80 MS
EKG QRS DURATION: 82 MS
EKG QTC CALCULATION (BAZETT): 448 MS
EKG QTC CALCULATION (BAZETT): 453 MS
EKG QTC CALCULATION (BAZETT): 540 MS
EKG R AXIS: -6 DEGREES
EKG R AXIS: 37 DEGREES
EKG R AXIS: 72 DEGREES
EKG T AXIS: 111 DEGREES
EKG T AXIS: 128 DEGREES
EKG T AXIS: 43 DEGREES
EKG VENTRICULAR RATE: 67 BPM
EKG VENTRICULAR RATE: 83 BPM
EKG VENTRICULAR RATE: 95 BPM